# Patient Record
Sex: FEMALE | Race: WHITE | NOT HISPANIC OR LATINO
[De-identification: names, ages, dates, MRNs, and addresses within clinical notes are randomized per-mention and may not be internally consistent; named-entity substitution may affect disease eponyms.]

---

## 2019-08-26 ENCOUNTER — APPOINTMENT (OUTPATIENT)
Dept: ORTHOPEDIC SURGERY | Facility: CLINIC | Age: 74
End: 2019-08-26
Payer: MEDICARE

## 2019-08-26 DIAGNOSIS — M41.86 OTHER FORMS OF SCOLIOSIS, LUMBAR REGION: ICD-10-CM

## 2019-08-26 DIAGNOSIS — Z85.43 PERSONAL HISTORY OF MALIGNANT NEOPLASM OF OVARY: ICD-10-CM

## 2019-08-26 DIAGNOSIS — J18.9 PNEUMONIA, UNSPECIFIED ORGANISM: ICD-10-CM

## 2019-08-26 DIAGNOSIS — M54.5 LOW BACK PAIN: ICD-10-CM

## 2019-08-26 DIAGNOSIS — I48.2 CHRONIC ATRIAL FIBRILLATION: ICD-10-CM

## 2019-08-26 PROCEDURE — 99204 OFFICE O/P NEW MOD 45 MIN: CPT

## 2019-08-26 PROCEDURE — 72100 X-RAY EXAM L-S SPINE 2/3 VWS: CPT

## 2019-08-26 PROCEDURE — 72070 X-RAY EXAM THORAC SPINE 2VWS: CPT

## 2019-08-27 PROBLEM — M41.86 OTHER FORM OF SCOLIOSIS OF LUMBAR SPINE: Status: ACTIVE | Noted: 2019-08-27

## 2019-08-27 PROBLEM — J18.9 COMMUNITY ACQUIRED PNEUMONIA: Status: RESOLVED | Noted: 2019-08-27 | Resolved: 2019-08-27

## 2019-08-27 PROBLEM — M54.5 ACUTE RIGHT-SIDED LOW BACK PAIN, UNSPECIFIED WHETHER SCIATICA PRESENT: Status: ACTIVE | Noted: 2019-08-27

## 2019-08-27 PROBLEM — Z85.43 OVARIAN CANCER IN REMISSION: Status: RESOLVED | Noted: 2019-08-27 | Resolved: 2019-08-27

## 2019-08-27 PROBLEM — I48.2 CHRONIC ATRIAL FIBRILLATION: Status: ACTIVE | Noted: 2019-08-27

## 2019-08-27 NOTE — DISCUSSION/SUMMARY
[Medication Risks Reviewed] : Medication risks reviewed [de-identified] : Feeling at [facility] was that her back pain was not related to her prior ovarian cancer.  Currently she cannot take nonsteroidal anti-inflammatory medications as she is on anticoagulation therapy for her atrial fibrillation.  She has already had a double Medrol Dosepak.  Currently her cardiologist is recommending a cardioversion after which she could take nonsteroidal anti-inflammatory medications.  They will bring me copies of the prior imaging studies for my review.

## 2019-08-27 NOTE — REASON FOR VISIT
[Degenerative Joint Disease] : degenerative joint disease [Initial Visit] : an initial visit for [Back Pain] : back pain [Spouse] : spouse [Spondylolistheses] : spondylolistheses

## 2019-08-27 NOTE — PHYSICAL EXAM
[de-identified] : She is fully alert and oriented with a normal mood and affect.  She transfers and moves around the secondary to lower back pain.  There are no cutaneous abnormalities or palpable bony defects of the spine.  There is no evidence of shortness of breath or respiratory distress.  There is no paravertebral muscle spasm but sidebending is limited.  There is no sciatic or trochanteric tenderness.  Forward flexion of the spine is limited to 40 degrees by lower back pain.  Her lower extremity neurological examination revealed 1+ symmetrical reflexes with normal motor power and sensation.  Straight leg raising is negative to 90 degrees.  Vascular exam shows no evidence of varicosities and there is no lymphedema.  Her knees have a full range of motion with normal stability.  There are no cutaneous abnormalities of the upper or lower extremities.  Her upper extremities are normal to inspection and her elbows have a full range of motion with normal motor power and stability. [de-identified] : AP and lateral x-rays of the thoracic and lumbar spine show no evidence of any destructive change.  She does have a moderate scoliosis.  At the area of transition from the thoracic to the lumbar curve today may be a mild deformity of 1 of the vertebral bodies from her fracture.  There is a grade 1/2 degenerative spondylolisthesis at L4-5.

## 2019-09-23 ENCOUNTER — LABORATORY RESULT (OUTPATIENT)
Age: 74
End: 2019-09-23

## 2019-09-23 ENCOUNTER — APPOINTMENT (OUTPATIENT)
Dept: ORTHOPEDIC SURGERY | Facility: CLINIC | Age: 74
End: 2019-09-23
Payer: MEDICARE

## 2019-09-23 PROCEDURE — 99215 OFFICE O/P EST HI 40 MIN: CPT

## 2019-09-24 ENCOUNTER — LABORATORY RESULT (OUTPATIENT)
Age: 74
End: 2019-09-24

## 2019-09-24 NOTE — DISCUSSION/SUMMARY
[Surgical risks reviewed] : Surgical risks reviewed [de-identified] : There is no history of any illness around the time of the onset of her symptoms but seeing all the studies today this is consistent with osteomyelitis.  She has been given a prescription today to get a CBC with a sed rate and a C-reactive protein and 3 sets of blood cultures.  She will get 3 more sets of blood cultures yesterday.  I discussed that if the sed rate and C-reactive protein is elevated that would further confirm the question of the diagnosis.  I also discussed if the cultures are negative she will need to stop her anticoagulation for an interventional radiology aspiration of the disc space to try to obtain the organism causing the infection rather than beginning with empiric therapy.  I will get her in contact with an infectious disease specialist as well and I will have her braced.

## 2019-09-24 NOTE — HISTORY OF PRESENT ILLNESS
[de-identified] : Since she was last seen she had a successful cardioversion.  She is still on anticoagulation.  She comes in today with all of her prior imaging studies as well as a new CT scan.  She has seen a neurosurgeon who told her she had 2 compression fractures and they discussed kyphoplasty.  Her pain is approximately the same. [Pain Location] : pain [Stable] : stable

## 2019-09-24 NOTE — PHYSICAL EXAM
[de-identified] : On examination there is no evidence of any excessive kyphosis.  Her lower extremity neurological exam continues to do so symmetrical reflexes with normal motor power in all lower extremity groups. [de-identified] : She brought all of her prior imaging studies and I have reviewed them at length.\par \par There is a CT scan that was performed at [facility] on June 27 that reveals only degenerative changes without evidence of significant abnormalities at the T12-L1 level.\par \par There is an MRI from June 11 that reveals marked signal changes adjacent to the T12-L1 disc space with some mild enhancement of the disc space consistent with discitis/osteomyelitis at that level.\par \par There is a recent CAT scan that shows destruction of the adjacent endplates at that level.  The geographical serpiginous nature of the destruction is certainly typical of discitis/osteomyelitis and not of compression fractures.

## 2019-09-26 ENCOUNTER — INPATIENT (INPATIENT)
Facility: HOSPITAL | Age: 74
LOS: 6 days | Discharge: HOME | DRG: 540 | End: 2019-10-03
Attending: INTERNAL MEDICINE | Admitting: INTERNAL MEDICINE
Payer: MEDICARE

## 2019-09-26 ENCOUNTER — CHART COPY (OUTPATIENT)
Age: 74
End: 2019-09-26

## 2019-09-26 VITALS
RESPIRATION RATE: 17 BRPM | WEIGHT: 117.95 LBS | HEIGHT: 64 IN | OXYGEN SATURATION: 99 % | TEMPERATURE: 98 F | SYSTOLIC BLOOD PRESSURE: 123 MMHG | DIASTOLIC BLOOD PRESSURE: 66 MMHG | HEART RATE: 90 BPM

## 2019-09-26 DIAGNOSIS — R78.81 BACTEREMIA: ICD-10-CM

## 2019-09-26 LAB
ALBUMIN SERPL ELPH-MCNC: 4 G/DL — SIGNIFICANT CHANGE UP (ref 3.3–5)
ALP SERPL-CCNC: 97 U/L — SIGNIFICANT CHANGE UP (ref 40–120)
ALT FLD-CCNC: 21 U/L — SIGNIFICANT CHANGE UP (ref 10–45)
ANION GAP SERPL CALC-SCNC: 10 MMOL/L — SIGNIFICANT CHANGE UP (ref 5–17)
AST SERPL-CCNC: 25 U/L — SIGNIFICANT CHANGE UP (ref 10–40)
BASOPHILS # BLD AUTO: 0 K/UL — SIGNIFICANT CHANGE UP (ref 0–0.2)
BASOPHILS NFR BLD AUTO: 0.4 % — SIGNIFICANT CHANGE UP (ref 0–2)
BILIRUB SERPL-MCNC: 0.3 MG/DL — SIGNIFICANT CHANGE UP (ref 0.2–1.2)
BUN SERPL-MCNC: 13 MG/DL — SIGNIFICANT CHANGE UP (ref 7–23)
CALCIUM SERPL-MCNC: 9.3 MG/DL — SIGNIFICANT CHANGE UP (ref 8.4–10.5)
CHLORIDE SERPL-SCNC: 102 MMOL/L — SIGNIFICANT CHANGE UP (ref 96–108)
CO2 SERPL-SCNC: 28 MMOL/L — SIGNIFICANT CHANGE UP (ref 22–31)
CREAT SERPL-MCNC: 0.7 MG/DL — SIGNIFICANT CHANGE UP (ref 0.5–1.3)
EOSINOPHIL # BLD AUTO: 0 K/UL — SIGNIFICANT CHANGE UP (ref 0–0.5)
EOSINOPHIL NFR BLD AUTO: 0.2 % — SIGNIFICANT CHANGE UP (ref 0–6)
GLUCOSE SERPL-MCNC: 83 MG/DL — SIGNIFICANT CHANGE UP (ref 70–99)
HCT VFR BLD CALC: 43.2 % — SIGNIFICANT CHANGE UP (ref 34.5–45)
HGB BLD-MCNC: 13.5 G/DL — SIGNIFICANT CHANGE UP (ref 11.5–15.5)
LYMPHOCYTES # BLD AUTO: 2.6 K/UL — SIGNIFICANT CHANGE UP (ref 1–3.3)
LYMPHOCYTES # BLD AUTO: 32.4 % — SIGNIFICANT CHANGE UP (ref 13–44)
MCHC RBC-ENTMCNC: 30 PG — SIGNIFICANT CHANGE UP (ref 27–34)
MCHC RBC-ENTMCNC: 31.3 GM/DL — LOW (ref 32–36)
MCV RBC AUTO: 95.9 FL — SIGNIFICANT CHANGE UP (ref 80–100)
MONOCYTES # BLD AUTO: 0.6 K/UL — SIGNIFICANT CHANGE UP (ref 0–0.9)
MONOCYTES NFR BLD AUTO: 8.1 % — SIGNIFICANT CHANGE UP (ref 2–14)
NEUTROPHILS # BLD AUTO: 4.6 K/UL — SIGNIFICANT CHANGE UP (ref 1.8–7.4)
NEUTROPHILS NFR BLD AUTO: 58.9 % — SIGNIFICANT CHANGE UP (ref 43–77)
PLATELET # BLD AUTO: 249 K/UL — SIGNIFICANT CHANGE UP (ref 150–400)
POTASSIUM SERPL-MCNC: 3.8 MMOL/L — SIGNIFICANT CHANGE UP (ref 3.5–5.3)
POTASSIUM SERPL-SCNC: 3.8 MMOL/L — SIGNIFICANT CHANGE UP (ref 3.5–5.3)
PROT SERPL-MCNC: 8 G/DL — SIGNIFICANT CHANGE UP (ref 6–8.3)
RBC # BLD: 4.51 M/UL — SIGNIFICANT CHANGE UP (ref 3.8–5.2)
RBC # FLD: 12.6 % — SIGNIFICANT CHANGE UP (ref 10.3–14.5)
SODIUM SERPL-SCNC: 140 MMOL/L — SIGNIFICANT CHANGE UP (ref 135–145)
WBC # BLD: 7.9 K/UL — SIGNIFICANT CHANGE UP (ref 3.8–10.5)
WBC # FLD AUTO: 7.9 K/UL — SIGNIFICANT CHANGE UP (ref 3.8–10.5)

## 2019-09-26 PROCEDURE — 99285 EMERGENCY DEPT VISIT HI MDM: CPT | Mod: GC

## 2019-09-26 RX ORDER — MAGNESIUM CHLORIDE
1 CRYSTALS MISCELLANEOUS
Qty: 0 | Refills: 0 | DISCHARGE

## 2019-09-26 RX ORDER — SELENIOUS ACID 40 UG/ML
1 INJECTION, SOLUTION INTRAVENOUS
Qty: 0 | Refills: 0 | DISCHARGE

## 2019-09-26 RX ORDER — ALPRAZOLAM 0.25 MG
1 TABLET ORAL
Qty: 0 | Refills: 0 | DISCHARGE

## 2019-09-26 RX ORDER — ALPRAZOLAM 0.25 MG
0.25 TABLET ORAL THREE TIMES A DAY
Refills: 0 | Status: DISCONTINUED | OUTPATIENT
Start: 2019-09-26 | End: 2019-10-03

## 2019-09-26 RX ORDER — METOPROLOL TARTRATE 50 MG
25 TABLET ORAL DAILY
Refills: 0 | Status: DISCONTINUED | OUTPATIENT
Start: 2019-09-26 | End: 2019-10-03

## 2019-09-26 RX ORDER — APIXABAN 2.5 MG/1
1 TABLET, FILM COATED ORAL
Qty: 0 | Refills: 0 | DISCHARGE

## 2019-09-26 RX ORDER — FOLIC ACID 0.8 MG
1 TABLET ORAL
Qty: 0 | Refills: 0 | DISCHARGE

## 2019-09-26 RX ORDER — VANCOMYCIN HCL 1 G
1000 VIAL (EA) INTRAVENOUS ONCE
Refills: 0 | Status: COMPLETED | OUTPATIENT
Start: 2019-09-26 | End: 2019-09-26

## 2019-09-26 RX ORDER — UBIDECARENONE 100 MG
1 CAPSULE ORAL
Qty: 0 | Refills: 0 | DISCHARGE

## 2019-09-26 RX ORDER — FOLIC ACID 0.8 MG
1 TABLET ORAL DAILY
Refills: 0 | Status: DISCONTINUED | OUTPATIENT
Start: 2019-09-26 | End: 2019-10-03

## 2019-09-26 RX ORDER — METOPROLOL TARTRATE 50 MG
1 TABLET ORAL
Qty: 0 | Refills: 0 | DISCHARGE

## 2019-09-26 RX ORDER — GABAPENTIN 400 MG/1
1 CAPSULE ORAL
Qty: 0 | Refills: 0 | DISCHARGE

## 2019-09-26 RX ORDER — GABAPENTIN 400 MG/1
300 CAPSULE ORAL AT BEDTIME
Refills: 0 | Status: DISCONTINUED | OUTPATIENT
Start: 2019-09-26 | End: 2019-10-03

## 2019-09-26 RX ORDER — VANCOMYCIN HCL 1 G
1000 VIAL (EA) INTRAVENOUS EVERY 12 HOURS
Refills: 0 | Status: DISCONTINUED | OUTPATIENT
Start: 2019-09-26 | End: 2019-10-03

## 2019-09-26 RX ORDER — OXYCODONE AND ACETAMINOPHEN 5; 325 MG/1; MG/1
1 TABLET ORAL ONCE
Refills: 0 | Status: DISCONTINUED | OUTPATIENT
Start: 2019-09-26 | End: 2019-09-26

## 2019-09-26 RX ORDER — PYRIDOXINE HCL (VITAMIN B6) 100 MG
1 TABLET ORAL
Qty: 0 | Refills: 0 | DISCHARGE

## 2019-09-26 RX ORDER — GABAPENTIN 400 MG/1
0 CAPSULE ORAL
Qty: 0 | Refills: 0 | DISCHARGE

## 2019-09-26 RX ORDER — VANCOMYCIN HCL 1 G
1000 VIAL (EA) INTRAVENOUS EVERY 12 HOURS
Refills: 0 | Status: DISCONTINUED | OUTPATIENT
Start: 2019-09-26 | End: 2019-09-26

## 2019-09-26 RX ORDER — APIXABAN 2.5 MG/1
5 TABLET, FILM COATED ORAL
Refills: 0 | Status: DISCONTINUED | OUTPATIENT
Start: 2019-09-26 | End: 2019-09-30

## 2019-09-26 RX ORDER — THIAMINE MONONITRATE (VIT B1) 100 MG
1 TABLET ORAL
Qty: 0 | Refills: 0 | DISCHARGE

## 2019-09-26 RX ORDER — APIXABAN 2.5 MG/1
0 TABLET, FILM COATED ORAL
Qty: 0 | Refills: 0 | DISCHARGE

## 2019-09-26 RX ORDER — ACETAMINOPHEN 500 MG
650 TABLET ORAL EVERY 6 HOURS
Refills: 0 | Status: DISCONTINUED | OUTPATIENT
Start: 2019-09-26 | End: 2019-10-03

## 2019-09-26 RX ORDER — ASCORBIC ACID 60 MG
1 TABLET,CHEWABLE ORAL
Qty: 0 | Refills: 0 | DISCHARGE

## 2019-09-26 RX ORDER — ACETAMINOPHEN 500 MG
2 TABLET ORAL
Qty: 0 | Refills: 0 | DISCHARGE

## 2019-09-26 RX ORDER — PREGABALIN 225 MG/1
1 CAPSULE ORAL
Qty: 0 | Refills: 0 | DISCHARGE

## 2019-09-26 RX ADMIN — Medication 0.25 MILLIGRAM(S): at 22:40

## 2019-09-26 RX ADMIN — GABAPENTIN 300 MILLIGRAM(S): 400 CAPSULE ORAL at 22:40

## 2019-09-26 RX ADMIN — APIXABAN 5 MILLIGRAM(S): 2.5 TABLET, FILM COATED ORAL at 20:05

## 2019-09-26 RX ADMIN — Medication 250 MILLIGRAM(S): at 13:06

## 2019-09-26 RX ADMIN — OXYCODONE AND ACETAMINOPHEN 1 TABLET(S): 5; 325 TABLET ORAL at 20:05

## 2019-09-26 RX ADMIN — OXYCODONE AND ACETAMINOPHEN 1 TABLET(S): 5; 325 TABLET ORAL at 21:15

## 2019-09-26 NOTE — H&P ADULT - NSICDXPASTMEDICALHX_GEN_ALL_CORE_FT
PAST MEDICAL HISTORY:  Atrial fibrillation     Back pain spasms    Guillain-[place]     Hypertension     Ovarian cancer

## 2019-09-26 NOTE — CONSULT NOTE ADULT - SUBJECTIVE AND OBJECTIVE BOX
HPI:   Patient is a 73y female with    REVIEW OF SYSTEMS:  All other review of systems negative (Comprehensive ROS)    PAST MEDICAL & SURGICAL HISTORY:      Allergies    niacin (Anaphylaxis; Rash)    Intolerances        Antimicrobials Day #      Other Medications:      FAMILY HISTORY:      SOCIAL HISTORY:  Smoking:     ETOH:     Drug Use:     Single     T(F): 98 (09-26-19 @ 11:03), Max: 98 (09-26-19 @ 11:03)  HR: 90 (09-26-19 @ 11:03)  BP: 123/66 (09-26-19 @ 11:03)  RR: 17 (09-26-19 @ 11:03)  SpO2: 99% (09-26-19 @ 11:03)  Wt(kg): --    PHYSICAL EXAM:  General: alert, no acute distress  Eyes:  anicteric, no conjunctival injection, no discharge  Oropharynx: no lesions or injection 	  Neck: supple, without adenopathy  Lungs: clear to auscultation  Heart: regular rate and rhythm; no murmur, rubs or gallops  Abdomen: soft, nondistended, nontender, without mass or organomegaly  Skin: no lesions  Extremities: no clubbing, cyanosis, or edema  Neurologic: alert, oriented, moves all extremities    LAB RESULTS:                MICROBIOLOGY REVIEWED:    RADIOLOGY REVIEWED: HPI:   Patient is a 73y female with hx ovarian CA, prior chemo, s/p resection, connie- and splenectomy, has R chest port for many years, recent afib, s/p cardioversion. Pt had back pain since about June, was recently seen by ortho spine and concern was raised possible infection in a spine: discitis/osteo. Pt had blood cult done  9/23-4/4 bottles with GPC presumptive CoNS. blood cult repeated on 9/24 and so far no growth. Pt states she had no fevers, gets occasional chills. NO recent abx use, no recent procedures except cardioversion about 2 weeks ago, CHRISTI done at that time showed "leaky aortic valve". NO recent travel, no sick contact    REVIEW OF SYSTEMS:  All other review of systems negative (Comprehensive ROS)    PAST MEDICAL & SURGICAL HISTORY:      Allergies    niacin (Anaphylaxis; Rash)    Intolerances        Antimicrobials Day #      Other Medications:      FAMILY HISTORY:      SOCIAL HISTORY:  Smoking:     ETOH:     Drug Use:     Single     T(F): 98 (09-26-19 @ 11:03), Max: 98 (09-26-19 @ 11:03)  HR: 90 (09-26-19 @ 11:03)  BP: 123/66 (09-26-19 @ 11:03)  RR: 17 (09-26-19 @ 11:03)  SpO2: 99% (09-26-19 @ 11:03)  Wt(kg): --    PHYSICAL EXAM:  General: alert, no acute distress  Eyes:  anicteric, no conjunctival injection, no discharge  Oropharynx: no lesions or injection 	  Neck: supple, without adenopathy  Lungs: clear to auscultation  R chest port  Heart: regular rate and rhythm; no murmur, rubs or gallops  Abdomen: soft, nondistended, nontender, without mass or organomegaly  Skin: no lesions  Extremities: no clubbing, cyanosis, or edema  Neurologic: alert, oriented, moves all extremities    LAB RESULTS:                MICROBIOLOGY REVIEWED:    RADIOLOGY REVIEWED:

## 2019-09-26 NOTE — CONSULT NOTE ADULT - SUBJECTIVE AND OBJECTIVE BOX
Patient is a 73y Female who presents c/o low back pain since 6/23/19 and sent in today by ID doctor after positive blood cultures drawn as outpatient.  No recent falls/trauma. Denies HS/LOC. Denies pain/injury elsewhere. Denies numbness/tingling/paresthesias/weakness. Denies bowel/bladder incontinence. Denies fevers/chills. No other complaints at this time.    Had previously seen Dr. Reyna in office as well as neurosurgeon in [facility].     PAST MEDICAL & SURGICAL HISTORY:  Back pain: spasms  Guillain-[place]  Hypertension  Ovarian cancer  Atrial fibrillation    MEDICATIONS  (STANDING):      Allergies    niacin (Anaphylaxis; Rash)    Intolerances                         13.5   7.9   )-----------( 249      ( 26 Sep 2019 13:05 )             43.2       26 Sep 2019 13:05    140    |  102    |  13     ----------------------------<  83     3.8     |  28     |  0.70     Ca    9.3        26 Sep 2019 13:05    TPro  8.0    /  Alb  4.0    /  TBili  0.3    /  DBili  x      /  AST  25     /  ALT  21     /  AlkPhos  97     26 Sep 2019 13:05              Vital Signs Last 24 Hrs  T(C): 36.7 (09-26-19 @ 12:45), Max: 36.7 (09-26-19 @ 11:03)  T(F): 98 (09-26-19 @ 12:45), Max: 98 (09-26-19 @ 11:03)  HR: 67 (09-26-19 @ 12:45) (67 - 90)  BP: 105/63 (09-26-19 @ 12:45) (105/63 - 123/66)  BP(mean): --  RR: 18 (09-26-19 @ 12:45) (17 - 18)  SpO2: 98% (09-26-19 @ 12:45) (98% - 99%)    Gen: NAD    Spine PE:  Skin intact  No gross deformity  No midline TTP C/T/L/S spine  No bony step offs  No paraspinal muscle ttp/hypertonicity   Negative clonus  Negative babinski  Negative isaacs  No saddle anesthesia    Motor:                   C5                C6              C7               C8           T1   R            5/5                5/5            5/5             5/5          5/5  L             5/5               5/5             5/5             5/5          5/5                L2             L3             L4               L5            S1  R         5/5           5/5          5/5             5/5           5/5  L          5/5          5/5           5/5             5/5           5/5    Sensory:            C5         C6         C7      C8       T1        (0=absent, 1=impaired, 2=normal, NT=not testable)  R         2            2           2        2         2  L          2            2           2        2         2               L2          L3         L4      L5       S1         (0=absent, 1=impaired, 2=normal, NT=not testable)  R         2            2            2        2        2  L          2            2           2        2         2    Secondary Survey: No TTP over bony prominences, SILT, palpable pulses, full/painless range of motion, compartments soft    Imaging: Outpatien imaging consistent with T12 osteodiscitis, sequential imaging reviewed by Dr. Reyna and discussed with musculoskeletal radiologist    A/P: 73y Female with T12 osteodiscitis likely secondary to CNS  Medical admission for conservative treatment of osteodiscitis  No new imaging needed at this time  FU ESR/CRP  FU Blood cultures  Appreciate ID recs  Pain control  WBAT with assistive devices as needed - FU Brace fitting tomorrow  SCDs  No acute orthopedic surgical intervention  Discussed with Dr. Reyna, agrees with above

## 2019-09-26 NOTE — ED ADULT NURSE NOTE - CHPI ED NUR SYMPTOMS NEG
no chills/no decreased eating/drinking/no tingling/no fever/no nausea/no vomiting/no weakness/no dizziness

## 2019-09-26 NOTE — H&P ADULT - HISTORY OF PRESENT ILLNESS
73y female with hx ovarian CA, prior chemo, s/p resection, connie- and splenectomy, has R chest port for many years, recent afib, s/p cardioversion. Pt had back pain since about June, was recently seen by ortho spine and concern was raised possible infection in a spine: discitis/osteo. Pt had blood cult done  9/23-4/4 bottles with GPC presumptive CoNS. blood cult repeated on 9/24 and so far no growth. Pt states she had no fevers, gets occasional chills. NO recent abx use, no recent procedures except cardioversion about 2 weeks ago, CHRISTI done at that time showed "leaky aortic valve". NO recent travel, no sick contact  pt denies weakness/ numbness in ext , dysuria, urinary / bowel incontinence / abd pain , nausea,v  headache, dizzienss, chest pain, shortness of breath 73y female with hx ovarian CA, prior chemo, s/p resection, connie- and splenectomy, has R chest port for many years, recent afib, s/p cardioversion. Pt had back pain since about June, was recently seen by ortho spine and concern was raised possible infection in a spine: discitis/osteo. Pt had blood cult done  9/23-4/4 bottles with GPC presumptive CoNS. blood cult repeated on 9/24 and so far no growth. Pt states she had no fevers, gets occasional chills. NO recent abx use, no recent procedures except cardioversion about 2 weeks ago, CHRISTI done at that time showed "leaky aortic valve". NO recent travel, no sick contact  pt denies weakness/ numbness in ext , dysuria, urinary / bowel incontinence / abd pain , nausea,v  headache, dizzienss, chest pain, shortness of breath  pt says her back pain is much better last 4 days

## 2019-09-26 NOTE — ED PROVIDER NOTE - OBJECTIVE STATEMENT
72 yo F hx ovarian CA treated years ago w/ port in place last cleaned in August, AF recent cardioversion 2 weeks ago on metoprolol and eliquis, presenting due to +Bcx drawn outpt. Has had weeks of b/l lower back pain. Saw Dr Reyna orthopedic surgeon and had CT done that showed compressoin fx vs osteomyelitis per the pt (reports not available, CDs with Axel) had blodo cultures drawn due to ?osteo and found to have coag neg staph in all 4 bottles so MOrrison called Dr Brief of ID and pt told to go to ER. Denies recent f/c n/v/d cp/sob, and notes her back pain is slightly improved over the last few days.

## 2019-09-26 NOTE — ED PROVIDER NOTE - PROGRESS NOTE DETAILS
Spoke w/ ortho resident who discussed case with Dr Reyna. Dr Reyna has reviewed the imaging w/ a [facility] radiologist and they agree pt has T12 discitis. Dr Reyna will be bringing imaging in tomrrow (The CD of the CT scan is currently with Axel). In light of this will hold on repeat CT scan. This was d/w Dr Luna as well. Orthopedics Dr Reyna did not want MRI spine at this time, would like to empirically tx for osteomyelitis at this time. Admitting physician Dr. Luna understands plan

## 2019-09-26 NOTE — ED ADULT NURSE NOTE - NSIMPLEMENTINTERV_GEN_ALL_ED
Implemented All Fall with Harm Risk Interventions:  Rose to call system. Call bell, personal items and telephone within reach. Instruct patient to call for assistance. Room bathroom lighting operational. Non-slip footwear when patient is off stretcher. Physically safe environment: no spills, clutter or unnecessary equipment. Stretcher in lowest position, wheels locked, appropriate side rails in place. Provide visual cue, wrist band, yellow gown, etc. Monitor gait and stability. Monitor for mental status changes and reorient to person, place, and time. Review medications for side effects contributing to fall risk. Reinforce activity limits and safety measures with patient and family. Provide visual clues: red socks.

## 2019-09-26 NOTE — ED PROVIDER NOTE - CLINICAL SUMMARY MEDICAL DECISION MAKING FREE TEXT BOX
Pt p/w + BCx on outpt workup and possible outpt imaging of osteomyelitis, no focal findings on exam and VS wnl. Plan: ID c/s already initiated by outpatient providers, labs, repeat cultures, repeat imaging, likely admission. Pt p/w + BCx on outpt workup and possible outpt imaging of osteomyelitis, no focal findings on exam and VS wnl. Plan: ID c/s already initiated by outpatient providers, labs, repeat cultures, repeat imaging, likely admission.    GARIMA Joy MD: Pt is a 74 y/o female with PMH ovarian CA treated years ago (port still in place, cleaned in August), AF recent cardioversion 2 weeks ago on metoprolol and eliquis, presenting due to +Bcx drawn outpt. Has had weeks of b/l lower back pain. Saw Dr Reyna orthopedic surgeon and had CT done that showed compression fx vs osteomyelitis per the pt (reports not available, CDs with Axel) had blood cultures drawn due to ?osteo and found to have coag neg staph in all 4 bottles so MOrrison called Dr Brief of ID and pt told to go to ER. Denies recent f/c n/v/d cp/sob, and notes her back pain is slightly improved over the last few days. Denies focal numbness/weakness, bowel/bladder incontinence, saddle anesthesia, pain radiating down legs, IVDA, f/c. Ddx includes, however, is not limited to: osteomyelitis / discitis, port infection, uti, pna, other. Plan: basic labs, bcx, vbg, spine consultation, empiric IV abx, TBA

## 2019-09-26 NOTE — H&P ADULT - ASSESSMENT
pt with above history p/w back pain , + blood cx as out pt, as per ortho likely T12 Osteo as per outside imaging     Back pain - MRI with contrast , pain control,     Bacteremia-  repeat blood cx , ID consult evaluated pt - start Vancomycin 1g IV q12, adjust based on cr clearance and vanco level  , echo     Afib - pt in sinus at present , cont eliquis    -

## 2019-09-26 NOTE — CONSULT NOTE ADULT - ASSESSMENT
73y female with hx ovarian CA, prior chemo,  s/p resection, connie- and splenectomy,  She has R chest port for many years, recent afib, s/p cardioversion.   Pt had back pain since about June, was recently seen by ortho spine and concern was raised possible infection in a spine: discitis/osteo.   Pt had blood cult done as outpatient, 9/23-4/4 bottles with GPC presumptive CoNS.   blood cult repeated on 9/24 and so far no growth. Pt states she had no fevers, gets occasional chills.   NO recent abx use, no recent procedures except cardioversion about 2 weeks ago, CHRISTI done at that time showed "leaky aortic valve".     PLAN:  repeat blood cult now  start Vancomycin 1g IV q12  consider repeat echo, may need CHRISTI to r/o veg  suggest eval by vascular or IR for port removal  d/w pt's family at length  d/w ER physician 73y female with hx ovarian CA, prior chemo,  s/p resection, connie- and splenectomy,  She has R chest port for many years, recent afib, s/p cardioversion.   Pt had back pain since about June, was recently seen by ortho spine and concern was raised possible infection in a spine: discitis/osteo.   Pt had blood cult done as outpatient, 9/23-4/4 bottles with GPC presumptive CoNS.   blood cult repeated on 9/24 and so far no growth. Pt states she had no fevers, gets occasional chills.   NO recent abx use, no recent procedures except cardioversion about 2 weeks ago, CHRISTI done at that time showed "leaky aortic valve".     PLAN:  repeat blood cult now  start Vancomycin 1g IV q12, adjust based on cr clearance and vanco level    consider repeat echo, may need CHRISTI to r/o veg  suggest eval by vascular or IR for port removal  d/w pt's family at length  d/w ER physician

## 2019-09-26 NOTE — ED PROVIDER NOTE - PHYSICAL EXAMINATION
General: Alert and Oriented. No apparent distress.  Head: Normocephalic and atraumatic.  Eyes: PERRLA with EOMI.  Neck: Supple. Trachea midline.   Cardiac: Normal S1 and S2 w/ RRR. No murmurs appreciated.   Pulmonary: Vesicular breath sounds bilaterally. No increased WOB. No wheezes or crackles.  Abdominal: Soft, non-tender. Normoactive bowel sounds.  Neurologic: No focal sensory or motor deficits.  Musculoskeletal: Strength appropriate in all 4 extremities for age with no limited ROM. No bony or paraspinal tenderness of the back. Gait steady.  Skin: Color appropriate for race. Intact, warm, and well-perfused.

## 2019-09-26 NOTE — ED ADULT NURSE NOTE - OBJECTIVE STATEMENT
Pt is a 74 yo F who came to the ED amb c/o back pain and spasms. States she had ct scans done was told by her orthopedist that she has a "blood infection" that spread to her spine, but her neurosurgeon says she has a compression fracture. Pt is a 74 yo F who came to the ED amb c/o back pain and spasms x 13 weeks. States she had ct scans done was told by her orthopedist that she has a "blood infection" that spread to her spine, but her neurosurgeon says she has a compression fracture T12-L1. Ambulatory, Pt is a 74 yo F who came to the ED amb c/o back pain and spasms x 13 weeks. States she had ct scans done was told by her orthopedist that she has a "blood infection" that spread to her spine, but her neurosurgeon says she has a compression fracture T12-L1. Ambulatory, no numbness/tingling in extremities, no change in urine/bowel habits. A/O x3 pain 4/10 left side to mid back. Denies cp/sob/palpitations, no fever/chills.

## 2019-09-26 NOTE — ED ADULT TRIAGE NOTE - CHIEF COMPLAINT QUOTE
sent here from infectious disease MD Brief, was told she has a blood infection and it went to her spine.  has been having chronic back pain for the past 12 weeks and was called today to come to ED

## 2019-09-27 ENCOUNTER — TRANSCRIPTION ENCOUNTER (OUTPATIENT)
Age: 74
End: 2019-09-27

## 2019-09-27 LAB
ALBUMIN SERPL ELPH-MCNC: 3.4 G/DL — SIGNIFICANT CHANGE UP (ref 3.3–5)
ALP SERPL-CCNC: 79 U/L — SIGNIFICANT CHANGE UP (ref 40–120)
ALT FLD-CCNC: 19 U/L — SIGNIFICANT CHANGE UP (ref 10–45)
ANION GAP SERPL CALC-SCNC: 11 MMOL/L — SIGNIFICANT CHANGE UP (ref 5–17)
AST SERPL-CCNC: 23 U/L — SIGNIFICANT CHANGE UP (ref 10–40)
BASOPHILS # BLD AUTO: 0.04 K/UL — SIGNIFICANT CHANGE UP (ref 0–0.2)
BASOPHILS NFR BLD AUTO: 0.5 % — SIGNIFICANT CHANGE UP (ref 0–2)
BILIRUB SERPL-MCNC: 0.4 MG/DL — SIGNIFICANT CHANGE UP (ref 0.2–1.2)
BUN SERPL-MCNC: 10 MG/DL — SIGNIFICANT CHANGE UP (ref 7–23)
CALCIUM SERPL-MCNC: 9 MG/DL — SIGNIFICANT CHANGE UP (ref 8.4–10.5)
CHLORIDE SERPL-SCNC: 101 MMOL/L — SIGNIFICANT CHANGE UP (ref 96–108)
CO2 SERPL-SCNC: 26 MMOL/L — SIGNIFICANT CHANGE UP (ref 22–31)
CREAT SERPL-MCNC: 0.65 MG/DL — SIGNIFICANT CHANGE UP (ref 0.5–1.3)
CRP SERPL-MCNC: 0.23 MG/DL — SIGNIFICANT CHANGE UP (ref 0–0.4)
EOSINOPHIL # BLD AUTO: 0.09 K/UL — SIGNIFICANT CHANGE UP (ref 0–0.5)
EOSINOPHIL NFR BLD AUTO: 1.1 % — SIGNIFICANT CHANGE UP (ref 0–6)
ERYTHROCYTE [SEDIMENTATION RATE] IN BLOOD: 25 MM/HR — HIGH (ref 0–20)
GLUCOSE SERPL-MCNC: 89 MG/DL — SIGNIFICANT CHANGE UP (ref 70–99)
HCT VFR BLD CALC: 38.6 % — SIGNIFICANT CHANGE UP (ref 34.5–45)
HCV AB S/CO SERPL IA: 0.25 S/CO — SIGNIFICANT CHANGE UP (ref 0–0.99)
HCV AB SERPL-IMP: SIGNIFICANT CHANGE UP
HGB BLD-MCNC: 12.2 G/DL — SIGNIFICANT CHANGE UP (ref 11.5–15.5)
IMM GRANULOCYTES NFR BLD AUTO: 0.2 % — SIGNIFICANT CHANGE UP (ref 0–1.5)
LYMPHOCYTES # BLD AUTO: 3.39 K/UL — HIGH (ref 1–3.3)
LYMPHOCYTES # BLD AUTO: 41.6 % — SIGNIFICANT CHANGE UP (ref 13–44)
MCHC RBC-ENTMCNC: 29.7 PG — SIGNIFICANT CHANGE UP (ref 27–34)
MCHC RBC-ENTMCNC: 31.6 GM/DL — LOW (ref 32–36)
MCV RBC AUTO: 93.9 FL — SIGNIFICANT CHANGE UP (ref 80–100)
MONOCYTES # BLD AUTO: 0.7 K/UL — SIGNIFICANT CHANGE UP (ref 0–0.9)
MONOCYTES NFR BLD AUTO: 8.6 % — SIGNIFICANT CHANGE UP (ref 2–14)
NEUTROPHILS # BLD AUTO: 3.9 K/UL — SIGNIFICANT CHANGE UP (ref 1.8–7.4)
NEUTROPHILS NFR BLD AUTO: 48 % — SIGNIFICANT CHANGE UP (ref 43–77)
PLATELET # BLD AUTO: 238 K/UL — SIGNIFICANT CHANGE UP (ref 150–400)
POTASSIUM SERPL-MCNC: 3.7 MMOL/L — SIGNIFICANT CHANGE UP (ref 3.5–5.3)
POTASSIUM SERPL-SCNC: 3.7 MMOL/L — SIGNIFICANT CHANGE UP (ref 3.5–5.3)
PROT SERPL-MCNC: 6.9 G/DL — SIGNIFICANT CHANGE UP (ref 6–8.3)
RBC # BLD: 4.11 M/UL — SIGNIFICANT CHANGE UP (ref 3.8–5.2)
RBC # FLD: 13.9 % — SIGNIFICANT CHANGE UP (ref 10.3–14.5)
SODIUM SERPL-SCNC: 138 MMOL/L — SIGNIFICANT CHANGE UP (ref 135–145)
TSH SERPL-MCNC: 3.99 UIU/ML — SIGNIFICANT CHANGE UP (ref 0.27–4.2)
WBC # BLD: 8.14 K/UL — SIGNIFICANT CHANGE UP (ref 3.8–10.5)
WBC # FLD AUTO: 8.14 K/UL — SIGNIFICANT CHANGE UP (ref 3.8–10.5)

## 2019-09-27 PROCEDURE — 72149 MRI LUMBAR SPINE W/DYE: CPT | Mod: 26

## 2019-09-27 PROCEDURE — 99222 1ST HOSP IP/OBS MODERATE 55: CPT

## 2019-09-27 RX ORDER — OXYCODONE AND ACETAMINOPHEN 5; 325 MG/1; MG/1
1 TABLET ORAL EVERY 6 HOURS
Refills: 0 | Status: DISCONTINUED | OUTPATIENT
Start: 2019-09-27 | End: 2019-10-03

## 2019-09-27 RX ADMIN — Medication 250 MILLIGRAM(S): at 17:23

## 2019-09-27 RX ADMIN — APIXABAN 5 MILLIGRAM(S): 2.5 TABLET, FILM COATED ORAL at 18:00

## 2019-09-27 RX ADMIN — OXYCODONE AND ACETAMINOPHEN 1 TABLET(S): 5; 325 TABLET ORAL at 17:00

## 2019-09-27 RX ADMIN — APIXABAN 5 MILLIGRAM(S): 2.5 TABLET, FILM COATED ORAL at 09:02

## 2019-09-27 RX ADMIN — Medication 250 MILLIGRAM(S): at 00:42

## 2019-09-27 RX ADMIN — Medication 1 TABLET(S): at 11:14

## 2019-09-27 RX ADMIN — Medication 1 MILLIGRAM(S): at 11:14

## 2019-09-27 RX ADMIN — Medication 25 MILLIGRAM(S): at 07:02

## 2019-09-27 RX ADMIN — Medication 0.25 MILLIGRAM(S): at 22:39

## 2019-09-27 RX ADMIN — Medication 0.25 MILLIGRAM(S): at 11:59

## 2019-09-27 RX ADMIN — OXYCODONE AND ACETAMINOPHEN 1 TABLET(S): 5; 325 TABLET ORAL at 17:30

## 2019-09-27 RX ADMIN — GABAPENTIN 300 MILLIGRAM(S): 400 CAPSULE ORAL at 22:39

## 2019-09-27 NOTE — DISCHARGE NOTE PROVIDER - CARE PROVIDERS DIRECT ADDRESSES
,DirectAddress_Unknown ,DirectAddress_Unknown,[email or web address],DirectAddress_Unknown ,[email or web address],DirectAddress_Unknown,DirectAddress_Unknown,[email or web address]

## 2019-09-27 NOTE — CONSULT NOTE ADULT - ASSESSMENT
73y female with hx ovarian CA, prior chemo, s/p resection, connie- and splenectomy, has R chest port for many years, recent afib diagnosed in June of this year, s/p cardioversion with CHRISTI about two weeks ago presents with back pain.    PAF  - Currently maintaining NSR  - No cardiac symptoms  - Continue Toprol XL 25 QD.  HR and BP acceptable  - Continue Eliquis 5 bid for stroke risk reduction  - No evidence of abnormal bleeding  - Check 2D echo routinely    Positive cultures  - Follow up ID  - Abx per primary team  - Suspicion for endocarditis low given recent CHRISTI with no vegetation done at [facility]  - May be beneficial to request that study  - Regular 2D echocardiogram for now    No evidence of acute ischemia, volume overload, or uncontrolled arrythmia  Monitor and replete lytes as needed  To follow closely with you while admitted

## 2019-09-27 NOTE — CONSULT NOTE ADULT - SUBJECTIVE AND OBJECTIVE BOX
[facility] - Vanessa Caballero, Guru Armstrong, Irene, João Zhao  Office Number: [phone]    Initial Consult Note    CHIEF COMPLAINT: Patient is a 73y old  Female who presents with a chief complaint of back pain       HPI:  73y female with hx ovarian CA, prior chemo, s/p resection, connie- and splenectomy, has R chest port for many years, recent afib diagnosed in June of this year, s/p cardioversion with CHRISTI about two weeks ago presents with back pain. Pt had back pain since about June, was recently seen by ortho spine and concern was raised possible infection in a spine: discitis/osteo. Pt had blood cult done  9/23-4/4 bottles with [facility] presumptive CoNS. blood cult repeated on 9/24 and so far no growth. Pt states she had no fevers, gets occasional chills. NO recent abx use, no recent procedures except cardioversion about 2 weeks ago, CHRISTI done at that time showed "leaky aortic valve". NO recent travel, no sick contact  pt denies weakness/ numbness in ext , dysuria, urinary / bowel incontinence / abd pain , nausea,v  headache, dizzienss, chest pain, shortness of breath  pt says her back pain is much better last 4 days      This morning, she is resting comfortably in bed.  SHe has no complaints of chest pain, increased dsypnea, PND, orthopnea, LE swelling.     PAST MEDICAL & SURGICAL HISTORY:  Back pain: spasms  Guillain-[place]  Hypertension  Ovarian cancer  Atrial fibrillation      SOCIAL HISTORY:  No tobacco, ethanol, or drug abuse.    FAMILY HISTORY:    No family history of acute MI or sudden cardiac death.    MEDICATIONS  (STANDING):  apixaban 5 milliGRAM(s) Oral two times a day  folic acid 1 milliGRAM(s) Oral daily  gabapentin 300 milliGRAM(s) Oral at bedtime  metoprolol succinate ER 25 milliGRAM(s) Oral daily  multivitamin 1 Tablet(s) Oral daily  vancomycin  IVPB 1000 milliGRAM(s) IV Intermittent every 12 hours    MEDICATIONS  (PRN):  acetaminophen   Tablet .. 650 milliGRAM(s) Oral every 6 hours PRN Temp greater or equal to 38C (100.4F), Mild Pain (1 - 3)  ALPRAZolam 0.25 milliGRAM(s) Oral three times a day PRN anxiety      Allergies    niacin (Anaphylaxis; Rash)             REVIEW OF SYSTEMS:       All other review of systems is negative unless indicated above    VITAL SIGNS:   Vital Signs Last 24 Hrs  T(C): 36.2 (27 Sep 2019 05:08), Max: 37.1 (26 Sep 2019 17:30)  T(F): 97.2 (27 Sep 2019 05:08), Max: 98.7 (26 Sep 2019 17:30)  HR: 67 (27 Sep 2019 05:08) (62 - 90)  BP: 131/54 (27 Sep 2019 05:08) (101/61 - 131/54)  BP(mean): --  RR: 18 (27 Sep 2019 05:08) (17 - 19)  SpO2: 95% (27 Sep 2019 05:08) (92% - 99%)    I&O's Summary    26 Sep 2019 07:01  -  27 Sep 2019 06:35  --------------------------------------------------------  IN: 360 mL / OUT: 0 mL / NET: 360 mL        On Exam:    Constitutional: NAD, alert and oriented x 3  Lungs:  Non-labored, breath sounds are clear bilaterally, No wheezing, rales or rhonchi  Cardiovascular: RRR.  S1 and S2 positive.  No murmurs, rubs, gallops or clicks  Gastrointestinal: Bowel Sounds present, soft, nontender.   Lymph: No peripheral edema. No cervical lymphadenopathy.  Neurological: Alert, no focal deficits  Skin: No rashes or ulcers   Psych:  Mood & affect appropriate.    LABS: All Labs Reviewed:                        13.5   7.9   )-----------( 249      ( 26 Sep 2019 13:05 )             43.2     26 Sep 2019 13:05    140    |  102    |  13     ----------------------------<  83     3.8     |  28     |  0.70     Ca    9.3        26 Sep 2019 13:05    TPro  8.0    /  Alb  4.0    /  TBili  0.3    /  DBili  x      /  AST  25     /  ALT  21     /  AlkPhos  97     26 Sep 2019 13:05                   RADIOLOGY:    EKG:  NSR.  LAE. Poor R wave progression

## 2019-09-27 NOTE — PROGRESS NOTE ADULT - ASSESSMENT
pt with above history p/w back pain , + blood cx as out pt, as per ortho likely T12 Osteo as per outside imaging     Back pain - MRI with contrast , pain control, compression fracture +, ortho f/u     Bacteremia-  repeat blood cx , ID consult evaluated pt - started on Vancomycin 1g IV q12, adjust based on cr clearance and vanco level  , echo     Afib - pt in sinus at present , cont eliquis    -

## 2019-09-27 NOTE — DISCHARGE NOTE PROVIDER - NSDCCPCAREPLAN_GEN_ALL_CORE_FT
PRINCIPAL DISCHARGE DIAGNOSIS  Diagnosis: Positive blood culture  Assessment and Plan of Treatment:       SECONDARY DISCHARGE DIAGNOSES  Diagnosis: Atrial fibrillation  Assessment and Plan of Treatment: Continue Toprol XL 25 QD.  HR and BP acceptable  Continue Eliquis 5 bid for stroke risk reduction  Atrial fibrillation is the most common heart rhythm problem & has the risk of stroke & heart attack  It helps if you control your blood pressure, not drink more than 1-2 alcohol drinks per day, cut down on caffeine, getting treatment for over active thyroid gland, & getting exercise  Call your doctor if you feel your heart racing or beating unusually, chest tightness or pain, lightheaded, faint, shortness of breath especially with exercise  It is important to take your heart medication as prescribed  You may be on anticoagulation which is very important to take as directed - you may need blood work to monitor drug levels PRINCIPAL DISCHARGE DIAGNOSIS  Diagnosis: Positive blood culture  Assessment and Plan of Treatment: Vancomycin 750 mg IV q12h for 5 more weeks  follow up with ID in 1 week  weekly CBC w/diff, cmp, vanco level q week   return to the hospital if worsens, fever      SECONDARY DISCHARGE DIAGNOSES  Diagnosis: Atrial fibrillation  Assessment and Plan of Treatment: Continue Toprol XL 25 QD.  HR and BP acceptable  RESUME  Eliquis on 10/3/2019 two times per day for stroke risk reduction  Atrial fibrillation is the most common heart rhythm problem & has the risk of stroke & heart attack  It helps if you control your blood pressure, not drink more than 1-2 alcohol drinks per day, cut down on caffeine, getting treatment for over active thyroid gland, & getting exercise  Call your doctor if you feel your heart racing or beating unusually, chest tightness or pain, lightheaded, faint, shortness of breath especially with exercise  It is important to take your heart medication as prescribed  You may be on anticoagulation which is very important to take as directed - you may need blood work to monitor drug levels PRINCIPAL DISCHARGE DIAGNOSIS  Diagnosis: Positive blood culture  Assessment and Plan of Treatment: Vancomycin 750 mg IV q12h for 5 more weeks last dose 11/6/19 via PICC  follow up with ID in 1 week  weekly CBC w/diff, cmp, vanco level q week   return to the hospital if worsens, fever      SECONDARY DISCHARGE DIAGNOSES  Diagnosis: Atrial fibrillation  Assessment and Plan of Treatment: Continue Toprol XL 25 QD.  HR and BP acceptable  RESUME  Eliquis on 10/3/2019 two times per day for stroke risk reduction  Atrial fibrillation is the most common heart rhythm problem & has the risk of stroke & heart attack  It helps if you control your blood pressure, not drink more than 1-2 alcohol drinks per day, cut down on caffeine, getting treatment for over active thyroid gland, & getting exercise  Call your doctor if you feel your heart racing or beating unusually, chest tightness or pain, lightheaded, faint, shortness of breath especially with exercise  It is important to take your heart medication as prescribed  You may be on anticoagulation which is very important to take as directed - you may need blood work to monitor drug levels PRINCIPAL DISCHARGE DIAGNOSIS  Diagnosis: Positive blood culture  Assessment and Plan of Treatment: Vancomycin 750 mg IV q12h for 5 more weeks last dose 11/6/19 via PICC  follow up with ID in 1 week  weekly CBC w/diff, cmp, vanco level q week   return to the hospital if worsens, fever  Mediport dressing removal site - leaving dressing in place for 48 hours and then remove.  Steri strips can stay in place until they fall off on their own.      SECONDARY DISCHARGE DIAGNOSES  Diagnosis: Atrial fibrillation  Assessment and Plan of Treatment: Continue Toprol XL 25 QD.  HR and BP acceptable  RESUME  Eliquis on 10/3/2019 two times per day for stroke risk reduction  Atrial fibrillation is the most common heart rhythm problem & has the risk of stroke & heart attack  It helps if you control your blood pressure, not drink more than 1-2 alcohol drinks per day, cut down on caffeine, getting treatment for over active thyroid gland, & getting exercise  Call your doctor if you feel your heart racing or beating unusually, chest tightness or pain, lightheaded, faint, shortness of breath especially with exercise  It is important to take your heart medication as prescribed  You may be on anticoagulation which is very important to take as directed - you may need blood work to monitor drug levels PRINCIPAL DISCHARGE DIAGNOSIS  Diagnosis: Positive blood culture  Assessment and Plan of Treatment: Vancomycin 750 mg IV q12h for 5 more weeks last dose 11/6/19 via PICC  follow up with ID in 1 week  weekly CBC w/diff, cmp, vanco level q week   return to the hospital if worsens, fever  Mediport dressing removal site - leaving dressing in place for 48 hours and then remove.  Steri strips can stay in place until they fall off on their own.      SECONDARY DISCHARGE DIAGNOSES  Diagnosis: Osteomyelitis  Assessment and Plan of Treatment: Continue with your antibiotics until course is completed.  You can follow up with your orthopedic doctor, Dr. Reyna, after discharge.    Diagnosis: Atrial fibrillation  Assessment and Plan of Treatment: Continue Toprol XL 25 QD.  HR and BP acceptable  RESUME  Eliquis on 10/3/2019 two times per day for stroke risk reduction  Atrial fibrillation is the most common heart rhythm problem & has the risk of stroke & heart attack  It helps if you control your blood pressure, not drink more than 1-2 alcohol drinks per day, cut down on caffeine, getting treatment for over active thyroid gland, & getting exercise  Call your doctor if you feel your heart racing or beating unusually, chest tightness or pain, lightheaded, faint, shortness of breath especially with exercise  It is important to take your heart medication as prescribed  You may be on anticoagulation which is very important to take as directed - you may need blood work to monitor drug levels

## 2019-09-27 NOTE — DISCHARGE NOTE PROVIDER - CARE PROVIDER_API CALL
Cliff Hdez  Phone: (   )    -  Fax: (   )    -  Follow Up Time: Cliff Hdez  Phone: (   )    -  Fax: (   )    -  Follow Up Time:     Vashti Zhao)  Internal Medicine  [address] [place] [address]  Phone: [phone]  Fax: [phone]  Follow Up Time: 1 week    Davey Giron)  Internal Medicine  [address]  [place] [address]  Phone: [phone]  Fax: [phone]  Follow Up Time: 1 week Vashti Zhao)  Internal Medicine  [address] [place] [address]  Phone: [phone]  Fax: [phone]  Follow Up Time: 1 week    Davey Giron)  Internal Medicine  [address], [address]  [place] [address]  Phone: [phone]  Fax: [phone]  Follow Up Time: 1 week    Cliff Hdez  Phone: (   )    -  Fax: (   )    -  Follow Up Time:     Luis A Reyna)  Orthopaedic Surgery  [address], [address]  [place] [address]  Phone: [phone]  Fax: [phone]  Follow Up Time:

## 2019-09-27 NOTE — DISCHARGE NOTE PROVIDER - PROVIDER TOKENS
FREE:[LAST:[Ketty],FIRST:[Cliff],PHONE:[(   )    -],FAX:[(   )    -]] FREE:[LAST:[Ketty],FIRST:[Cliff],PHONE:[(   )    -],FAX:[(   )    -]],PROVIDER:[TOKEN:[[id number]],FOLLOWUP:[1 week]],PROVIDER:[TOKEN:[[id number]],FOLLOWUP:[1 week]] PROVIDER:[TOKEN:[[id number]],FOLLOWUP:[1 week]],PROVIDER:[TOKEN:[[id number]],FOLLOWUP:[1 week]],FREE:[LAST:[Ketty],FIRST:[Cliff],PHONE:[(   )    -],FAX:[(   )    -]],PROVIDER:[TOKEN:[[id number]]]

## 2019-09-27 NOTE — DISCHARGE NOTE PROVIDER - HOSPITAL COURSE
73y female with hx ovarian CA, prior chemo, s/p resection, connie- and splenectomy, has R chest port for many years, recent afib, s/p cardioversion. Pt had back pain since about June, was recently seen by ortho spine and concern was raised possible infection in a spine: discitis/osteo. Pt had blood cult done  9/23-4/4 bottles with [facility] presumptive CoNS. blood cult repeated on 9/24 and so far no growth. Pt states she had no fevers, gets occasional chills. NO recent abx use, no recent procedures except cardioversion about 2 weeks ago, CHRISTI done at that time showed "leaky aortic valve". NO recent travel, no sick contact    pt denies weakness/ numbness in ext , dysuria, urinary / bowel incontinence / abd pain , nausea,v  headache, dizzienss, chest pain, shortness of breath    pt says her back pain is much better last 4 days 73y female with hx ovarian CA, prior chemo 7 years ago, s/p resection, connie- and splenectomy, has R chest port for many years, recent afib diagnosed in June of this year, s/p cardioversion with CHRISTI 3 weeks ago presented with back pain.        -Back pain Discitis- MRI with contrast WITH T12/ L1 discitis/ osteo, pain control, compression fracture +        - Bacteremia    - ID Dr chakraborty consulted- Vancomycin IV x 6 weeks total, weekly CBC w/diff, CMP, vanco level    -Mediport removal and PICC insertion 10/1/2019    - Suspicion for endocarditis low given recent CHRISTI with no vegetation done at [facility]        -PAF now in NSR s/p CV 3 weeks ago, cardio consulted:     No evidence of acute ischemia, volume overload, or uncontrolled arrythmia    Pt to follow up with cardiology outpatient.        cleared for discharge home by Dr Luna with ID and cardiology outaptient follow up

## 2019-09-28 LAB — VANCOMYCIN TROUGH SERPL-MCNC: 11.5 UG/ML — SIGNIFICANT CHANGE UP (ref 10–20)

## 2019-09-28 PROCEDURE — 99232 SBSQ HOSP IP/OBS MODERATE 35: CPT

## 2019-09-28 RX ORDER — ONDANSETRON 8 MG/1
4 TABLET, FILM COATED ORAL ONCE
Refills: 0 | Status: DISCONTINUED | OUTPATIENT
Start: 2019-09-28 | End: 2019-10-03

## 2019-09-28 RX ADMIN — Medication 250 MILLIGRAM(S): at 06:32

## 2019-09-28 RX ADMIN — Medication 1 MILLIGRAM(S): at 10:06

## 2019-09-28 RX ADMIN — Medication 25 MILLIGRAM(S): at 10:06

## 2019-09-28 RX ADMIN — Medication 0.25 MILLIGRAM(S): at 15:00

## 2019-09-28 RX ADMIN — Medication 1 TABLET(S): at 10:06

## 2019-09-28 RX ADMIN — GABAPENTIN 300 MILLIGRAM(S): 400 CAPSULE ORAL at 21:47

## 2019-09-28 RX ADMIN — OXYCODONE AND ACETAMINOPHEN 1 TABLET(S): 5; 325 TABLET ORAL at 17:31

## 2019-09-28 RX ADMIN — Medication 250 MILLIGRAM(S): at 17:27

## 2019-09-28 RX ADMIN — Medication 0.25 MILLIGRAM(S): at 21:50

## 2019-09-28 RX ADMIN — APIXABAN 5 MILLIGRAM(S): 2.5 TABLET, FILM COATED ORAL at 17:30

## 2019-09-28 RX ADMIN — APIXABAN 5 MILLIGRAM(S): 2.5 TABLET, FILM COATED ORAL at 10:06

## 2019-09-28 RX ADMIN — OXYCODONE AND ACETAMINOPHEN 1 TABLET(S): 5; 325 TABLET ORAL at 16:52

## 2019-09-28 NOTE — PROGRESS NOTE ADULT - ASSESSMENT
pt with above history p/w back pain , + blood cx as out pt, as per ortho likely T12 Osteo as per outside imaging     Back pain - MRI with contrast WITH T12/ L1 discitis/ osteo, pain control, compression fracture +, ortho f/u     Bacteremia-  repeat blood cx , ID consult evaluated pt - started on Vancomycin 1g IV q12, adjust based on cr clearance and vanco level  , echo     Afib - pt in sinus at present , cont eliquis    -

## 2019-09-28 NOTE — PROGRESS NOTE ADULT - ASSESSMENT
72 yo F hx splenectomy , ovarian ca s/p chemo with port, developed back pain 3 months ago , had a course of po steroids and PT no better, had afib s/p CHRISTI 2 weeks ago and cardioversion with by report aortic insufficiency. She saw ortho spine who looked at recent spine imaging and saw om, discitis, had blood cultures drawn on 2 successive days now both days cultures are growing coag neg staph.   New mri shows om, discitis and phlegmon. Source of bacteremia is likely the port.     Plan:  continue vancomycin, monitor level  follow up latest cultures  remove port since she doesnt need more chemo and high grade bacteremia  cardiology input appreciated, 2d echo for now, since CHRISTI with cardioversion done 2 weeks ago with no veg  Ortho spine follows  picc once bacteremia is controlled

## 2019-09-28 NOTE — PROGRESS NOTE ADULT - ASSESSMENT
73y female with hx ovarian CA, prior chemo, s/p resection, connie- and splenectomy, has R chest port for many years, recent afib diagnosed in June of this year, s/p cardioversion with CHRISTI about two weeks ago presents with back pain.    PAF  - Currently maintaining NSR  - No cardiac symptoms  - Continue Toprol XL 25 QD.  HR and BP acceptable  - Continue Eliquis 5 bid for stroke risk reduction  - No evidence of abnormal bleeding  - Check 2D echo routinely    Positive cultures  - Follow up ID  - Abx per primary team  - Suspicion for endocarditis low given recent CHRISTI with no vegetation done at [facility]  - May be beneficial to request that study  - Regular 2D echocardiogram for now    No evidence of acute ischemia, volume overload, or uncontrolled arrythmia  Monitor and replete lytes as needed  To follow closely with you while admitted

## 2019-09-29 LAB
ANION GAP SERPL CALC-SCNC: 9 MMOL/L — SIGNIFICANT CHANGE UP (ref 5–17)
BUN SERPL-MCNC: 12 MG/DL — SIGNIFICANT CHANGE UP (ref 7–23)
CALCIUM SERPL-MCNC: 9.1 MG/DL — SIGNIFICANT CHANGE UP (ref 8.4–10.5)
CHLORIDE SERPL-SCNC: 102 MMOL/L — SIGNIFICANT CHANGE UP (ref 96–108)
CO2 SERPL-SCNC: 27 MMOL/L — SIGNIFICANT CHANGE UP (ref 22–31)
CREAT SERPL-MCNC: 0.71 MG/DL — SIGNIFICANT CHANGE UP (ref 0.5–1.3)
CULTURE RESULTS: SIGNIFICANT CHANGE UP
GLUCOSE SERPL-MCNC: 97 MG/DL — SIGNIFICANT CHANGE UP (ref 70–99)
GRAM STN FLD: SIGNIFICANT CHANGE UP
HCT VFR BLD CALC: 37.7 % — SIGNIFICANT CHANGE UP (ref 34.5–45)
HGB BLD-MCNC: 12 G/DL — SIGNIFICANT CHANGE UP (ref 11.5–15.5)
MCHC RBC-ENTMCNC: 30.2 PG — SIGNIFICANT CHANGE UP (ref 27–34)
MCHC RBC-ENTMCNC: 31.8 GM/DL — LOW (ref 32–36)
MCV RBC AUTO: 94.7 FL — SIGNIFICANT CHANGE UP (ref 80–100)
PLATELET # BLD AUTO: 222 K/UL — SIGNIFICANT CHANGE UP (ref 150–400)
POTASSIUM SERPL-MCNC: 3.9 MMOL/L — SIGNIFICANT CHANGE UP (ref 3.5–5.3)
POTASSIUM SERPL-SCNC: 3.9 MMOL/L — SIGNIFICANT CHANGE UP (ref 3.5–5.3)
RBC # BLD: 3.98 M/UL — SIGNIFICANT CHANGE UP (ref 3.8–5.2)
RBC # FLD: 13.8 % — SIGNIFICANT CHANGE UP (ref 10.3–14.5)
SODIUM SERPL-SCNC: 138 MMOL/L — SIGNIFICANT CHANGE UP (ref 135–145)
SPECIMEN SOURCE: SIGNIFICANT CHANGE UP
WBC # BLD: 6.57 K/UL — SIGNIFICANT CHANGE UP (ref 3.8–10.5)
WBC # FLD AUTO: 6.57 K/UL — SIGNIFICANT CHANGE UP (ref 3.8–10.5)

## 2019-09-29 PROCEDURE — 99232 SBSQ HOSP IP/OBS MODERATE 35: CPT

## 2019-09-29 RX ORDER — SENNA PLUS 8.6 MG/1
2 TABLET ORAL AT BEDTIME
Refills: 0 | Status: DISCONTINUED | OUTPATIENT
Start: 2019-09-29 | End: 2019-10-03

## 2019-09-29 RX ORDER — DOCUSATE SODIUM 100 MG
100 CAPSULE ORAL THREE TIMES A DAY
Refills: 0 | Status: DISCONTINUED | OUTPATIENT
Start: 2019-09-29 | End: 2019-10-03

## 2019-09-29 RX ORDER — POLYETHYLENE GLYCOL 3350 17 G/17G
17 POWDER, FOR SOLUTION ORAL DAILY
Refills: 0 | Status: DISCONTINUED | OUTPATIENT
Start: 2019-09-29 | End: 2019-10-03

## 2019-09-29 RX ADMIN — APIXABAN 5 MILLIGRAM(S): 2.5 TABLET, FILM COATED ORAL at 14:14

## 2019-09-29 RX ADMIN — OXYCODONE AND ACETAMINOPHEN 1 TABLET(S): 5; 325 TABLET ORAL at 19:02

## 2019-09-29 RX ADMIN — Medication 250 MILLIGRAM(S): at 17:28

## 2019-09-29 RX ADMIN — OXYCODONE AND ACETAMINOPHEN 1 TABLET(S): 5; 325 TABLET ORAL at 19:51

## 2019-09-29 RX ADMIN — Medication 1 MILLIGRAM(S): at 11:22

## 2019-09-29 RX ADMIN — APIXABAN 5 MILLIGRAM(S): 2.5 TABLET, FILM COATED ORAL at 21:14

## 2019-09-29 RX ADMIN — GABAPENTIN 300 MILLIGRAM(S): 400 CAPSULE ORAL at 21:14

## 2019-09-29 RX ADMIN — Medication 25 MILLIGRAM(S): at 05:02

## 2019-09-29 RX ADMIN — Medication 0.25 MILLIGRAM(S): at 21:13

## 2019-09-29 RX ADMIN — Medication 250 MILLIGRAM(S): at 05:02

## 2019-09-29 RX ADMIN — SENNA PLUS 2 TABLET(S): 8.6 TABLET ORAL at 21:14

## 2019-09-29 RX ADMIN — Medication 100 MILLIGRAM(S): at 21:14

## 2019-09-29 RX ADMIN — Medication 1 TABLET(S): at 11:22

## 2019-09-29 NOTE — PROGRESS NOTE ADULT - ASSESSMENT
73y female with hx ovarian CA, prior chemo, s/p resection, connie- and splenectomy, has R chest port for many years, recent afib diagnosed in June of this year, s/p cardioversion with CHRISTI about two weeks ago presents with back pain.    PAF  - Currently maintaining NSR  - No cardiac symptoms  - Continue Toprol XL 25 QD.  HR and BP acceptable  - Continue Eliquis 5 bid for stroke risk reduction, though will need to hold if plan for line extraction  - No evidence of abnormal bleeding  - Check 2D echo    Positive cultures  - Follow up ID  - Abx per primary team  - Suspicion for endocarditis low given recent CHRISTI with no vegetation done at [facility]  - May be beneficial to request that study  - Regular 2D echocardiogram for now    No evidence of acute ischemia, volume overload, or uncontrolled arrythmia  Monitor and replete lytes as needed  To follow closely with you while admitted

## 2019-09-29 NOTE — PROGRESS NOTE ADULT - ASSESSMENT
74 yo F hx splenectomy , ovarian ca s/p chemo with port, developed back pain 3 months ago , had a course of po steroids and PT no better, had afib s/p CHRISTI 2 weeks ago and cardioversion with by report aortic insufficiency.   She saw ortho spine who looked at recent spine imaging and saw om, discitis, had blood cultures drawn on 2 successive days now both days cultures are growing coag neg staph.   New mri shows om, discitis and phlegmon. Source of bacteremia is likely the port.   repeat cult 9/26 and 9/27 again with [facility] clusters    Plan:  continue vancomycin, monitor level  repeat cultures to ensure clearance of bacteremia  remove port since she doesnt need more chemo and high grade bacteremia  cardiology input appreciated, 2d echo for now, since CHRISTI with cardioversion done 2 weeks ago with no veg  Ortho spine follows  picc once bacteremia is controlled

## 2019-09-30 LAB
CULTURE RESULTS: SIGNIFICANT CHANGE UP
CULTURE RESULTS: SIGNIFICANT CHANGE UP
INR BLD: 1.31 RATIO — HIGH (ref 0.88–1.16)
PROTHROM AB SERPL-ACNC: 15 SEC — HIGH (ref 10–13.1)
SPECIMEN SOURCE: SIGNIFICANT CHANGE UP
SPECIMEN SOURCE: SIGNIFICANT CHANGE UP
VANCOMYCIN TROUGH SERPL-MCNC: 17.8 UG/ML — SIGNIFICANT CHANGE UP (ref 10–20)

## 2019-09-30 PROCEDURE — 93306 TTE W/DOPPLER COMPLETE: CPT | Mod: 26

## 2019-09-30 PROCEDURE — 99232 SBSQ HOSP IP/OBS MODERATE 35: CPT

## 2019-09-30 RX ADMIN — Medication 100 MILLIGRAM(S): at 05:48

## 2019-09-30 RX ADMIN — Medication 250 MILLIGRAM(S): at 06:01

## 2019-09-30 RX ADMIN — POLYETHYLENE GLYCOL 3350 17 GRAM(S): 17 POWDER, FOR SOLUTION ORAL at 11:39

## 2019-09-30 RX ADMIN — Medication 1 TABLET(S): at 11:38

## 2019-09-30 RX ADMIN — Medication 0.25 MILLIGRAM(S): at 21:54

## 2019-09-30 RX ADMIN — Medication 250 MILLIGRAM(S): at 17:44

## 2019-09-30 RX ADMIN — GABAPENTIN 300 MILLIGRAM(S): 400 CAPSULE ORAL at 21:55

## 2019-09-30 RX ADMIN — Medication 1 MILLIGRAM(S): at 11:38

## 2019-09-30 RX ADMIN — Medication 25 MILLIGRAM(S): at 05:48

## 2019-09-30 NOTE — PROGRESS NOTE ADULT - ASSESSMENT
74 yo F hx splenectomy , ovarian ca s/p chemo with port, developed back pain 3 months ago , had a course of po steroids and PT no better, had afib s/p CHRISTI 2 weeks ago and cardioversion with by report aortic insufficiency.   She saw ortho spine who looked at recent spine imaging and saw om, discitis, had blood cultures drawn on 2 successive days now both days cultures are growing coag neg staph.   New mri shows om, discitis and phlegmon. Source of bacteremia is likely the port.   repeat cult 9/26 and 9/27 again with [facility] clusters    Plan:  continue vancomycin, monitor level  f/u repeat cultures to ensure clearance of bacteremia  remove port since she doesnt need more chemo and high grade bacteremia  cardiology input appreciated, 2d echo for now, since CHRISTI with cardioversion done 2 weeks ago with no veg  Ortho spine follows  picc once bacteremia is controlled 72 yo F hx splenectomy , ovarian ca s/p chemo with port, developed back pain 3 months ago , had a course of po steroids and PT no better, had afib s/p CHRISTI 2 weeks ago and cardioversion with by report aortic insufficiency.   She saw ortho spine who looked at recent spine imaging and saw om, discitis, had blood cultures drawn on 2 successive days now both days cultures are growing coag neg staph.   New mri shows om, discitis and phlegmon. Source of bacteremia is likely the port.   repeat cult 9/26 and 9/27 again with [facility]PC clusters  2d Echo: mod AR    Plan:  continue vancomycin, monitor level  f/u repeat cultures to ensure clearance of bacteremia  remove port since she doesnt need more chemo and high grade bacteremia  cardiology input appreciated,  CHRISTI with cardioversion done 2 weeks ago with no veg  Ortho spine follows  picc once bacteremia is controlled

## 2019-09-30 NOTE — PROGRESS NOTE ADULT - ASSESSMENT
73y female with hx ovarian CA, prior chemo, s/p resection, connie- and splenectomy, has R chest port for many years, recent afib diagnosed in June of this year, s/p cardioversion with CHRISTI about two weeks ago presents with back pain.    PAF  - Currently maintaining NSR  - No cardiac symptoms  - Continue Toprol XL 25 QD.  HR and BP acceptable  - Continue Eliquis 5 bid for stroke risk reduction, though will need to hold if plan for line extraction  - No evidence of abnormal bleeding  - Check 2D echo    Positive cultures  - Follow up ID  - Abx per primary team  - Suspicion for endocarditis low given recent CHRISTI with no vegetation done at [facility]  - May be beneficial to request that study  - Regular 2D echocardiogram for now    No evidence of acute ischemia, volume overload, or uncontrolled arrythmia  Monitor and replete lytes as needed  To follow closely with you while admitted

## 2019-10-01 LAB
ALBUMIN SERPL ELPH-MCNC: 3.3 G/DL — SIGNIFICANT CHANGE UP (ref 3.3–5)
ALP SERPL-CCNC: 86 U/L — SIGNIFICANT CHANGE UP (ref 40–120)
ALT FLD-CCNC: 21 U/L — SIGNIFICANT CHANGE UP (ref 10–45)
ANION GAP SERPL CALC-SCNC: 9 MMOL/L — SIGNIFICANT CHANGE UP (ref 5–17)
AST SERPL-CCNC: 28 U/L — SIGNIFICANT CHANGE UP (ref 10–40)
BASOPHILS # BLD AUTO: 0.07 K/UL — SIGNIFICANT CHANGE UP (ref 0–0.2)
BASOPHILS NFR BLD AUTO: 1 % — SIGNIFICANT CHANGE UP (ref 0–2)
BILIRUB SERPL-MCNC: 0.4 MG/DL — SIGNIFICANT CHANGE UP (ref 0.2–1.2)
BUN SERPL-MCNC: 10 MG/DL — SIGNIFICANT CHANGE UP (ref 7–23)
CALCIUM SERPL-MCNC: 9 MG/DL — SIGNIFICANT CHANGE UP (ref 8.4–10.5)
CHLORIDE SERPL-SCNC: 102 MMOL/L — SIGNIFICANT CHANGE UP (ref 96–108)
CO2 SERPL-SCNC: 27 MMOL/L — SIGNIFICANT CHANGE UP (ref 22–31)
CREAT SERPL-MCNC: 0.69 MG/DL — SIGNIFICANT CHANGE UP (ref 0.5–1.3)
EOSINOPHIL # BLD AUTO: 0.1 K/UL — SIGNIFICANT CHANGE UP (ref 0–0.5)
EOSINOPHIL NFR BLD AUTO: 1.5 % — SIGNIFICANT CHANGE UP (ref 0–6)
GLUCOSE SERPL-MCNC: 104 MG/DL — HIGH (ref 70–99)
HCT VFR BLD CALC: 38.3 % — SIGNIFICANT CHANGE UP (ref 34.5–45)
HGB BLD-MCNC: 12.1 G/DL — SIGNIFICANT CHANGE UP (ref 11.5–15.5)
IMM GRANULOCYTES NFR BLD AUTO: 0.1 % — SIGNIFICANT CHANGE UP (ref 0–1.5)
LYMPHOCYTES # BLD AUTO: 2.4 K/UL — SIGNIFICANT CHANGE UP (ref 1–3.3)
LYMPHOCYTES # BLD AUTO: 35.6 % — SIGNIFICANT CHANGE UP (ref 13–44)
MAGNESIUM SERPL-MCNC: 1.9 MG/DL — SIGNIFICANT CHANGE UP (ref 1.6–2.6)
MCHC RBC-ENTMCNC: 30.3 PG — SIGNIFICANT CHANGE UP (ref 27–34)
MCHC RBC-ENTMCNC: 31.6 GM/DL — LOW (ref 32–36)
MCV RBC AUTO: 95.8 FL — SIGNIFICANT CHANGE UP (ref 80–100)
MONOCYTES # BLD AUTO: 0.73 K/UL — SIGNIFICANT CHANGE UP (ref 0–0.9)
MONOCYTES NFR BLD AUTO: 10.8 % — SIGNIFICANT CHANGE UP (ref 2–14)
NEUTROPHILS # BLD AUTO: 3.43 K/UL — SIGNIFICANT CHANGE UP (ref 1.8–7.4)
NEUTROPHILS NFR BLD AUTO: 51 % — SIGNIFICANT CHANGE UP (ref 43–77)
PLATELET # BLD AUTO: 231 K/UL — SIGNIFICANT CHANGE UP (ref 150–400)
POTASSIUM SERPL-MCNC: 4.1 MMOL/L — SIGNIFICANT CHANGE UP (ref 3.5–5.3)
POTASSIUM SERPL-SCNC: 4.1 MMOL/L — SIGNIFICANT CHANGE UP (ref 3.5–5.3)
PROT SERPL-MCNC: 6.5 G/DL — SIGNIFICANT CHANGE UP (ref 6–8.3)
RBC # BLD: 4 M/UL — SIGNIFICANT CHANGE UP (ref 3.8–5.2)
RBC # FLD: 13.6 % — SIGNIFICANT CHANGE UP (ref 10.3–14.5)
SODIUM SERPL-SCNC: 138 MMOL/L — SIGNIFICANT CHANGE UP (ref 135–145)
WBC # BLD: 6.74 K/UL — SIGNIFICANT CHANGE UP (ref 3.8–10.5)
WBC # FLD AUTO: 6.74 K/UL — SIGNIFICANT CHANGE UP (ref 3.8–10.5)

## 2019-10-01 PROCEDURE — 77001 FLUOROGUIDE FOR VEIN DEVICE: CPT | Mod: 26,59

## 2019-10-01 PROCEDURE — 36573 INSJ PICC RS&I 5 YR+: CPT

## 2019-10-01 PROCEDURE — 36590 REMOVAL TUNNELED CV CATH: CPT

## 2019-10-01 PROCEDURE — 99232 SBSQ HOSP IP/OBS MODERATE 35: CPT

## 2019-10-01 RX ORDER — CHLORHEXIDINE GLUCONATE 213 G/1000ML
1 SOLUTION TOPICAL
Refills: 0 | Status: DISCONTINUED | OUTPATIENT
Start: 2019-10-01 | End: 2019-10-03

## 2019-10-01 RX ORDER — SODIUM CHLORIDE 9 MG/ML
10 INJECTION INTRAMUSCULAR; INTRAVENOUS; SUBCUTANEOUS
Refills: 0 | Status: DISCONTINUED | OUTPATIENT
Start: 2019-10-01 | End: 2019-10-03

## 2019-10-01 RX ADMIN — Medication 0.25 MILLIGRAM(S): at 22:33

## 2019-10-01 RX ADMIN — Medication 250 MILLIGRAM(S): at 19:38

## 2019-10-01 RX ADMIN — Medication 1 TABLET(S): at 11:42

## 2019-10-01 RX ADMIN — Medication 25 MILLIGRAM(S): at 05:26

## 2019-10-01 RX ADMIN — Medication 250 MILLIGRAM(S): at 05:27

## 2019-10-01 RX ADMIN — GABAPENTIN 300 MILLIGRAM(S): 400 CAPSULE ORAL at 22:33

## 2019-10-01 RX ADMIN — Medication 1 MILLIGRAM(S): at 11:42

## 2019-10-01 NOTE — PROGRESS NOTE ADULT - ASSESSMENT
73y female with hx ovarian CA, prior chemo, s/p resection, connie- and splenectomy, has R chest port for many years, recent afib diagnosed in June of this year, s/p cardioversion with CHRISTI about two weeks ago presents with back pain.    PAF  - Currently maintaining NSR  - No cardiac symptoms  - Continue Toprol XL 25 QD.  HR and BP acceptable  - hold eliquis in preparation for port extraction/picc placement  - No evidence of abnormal bleeding  - echocardiogram with normal lv function, moderate AI. No evidence of endocarditis, and AI is unchanged per daughter.    Positive cultures  - Follow up ID  - Abx per primary team  - Suspicion for endocarditis low given recent CHRISTI with no vegetation done at [facility]  - obtain report if possible    No evidence of acute ischemia, volume overload, or uncontrolled arrythmia  Monitor and replete lytes as needed  To follow closely with you while admitted

## 2019-10-01 NOTE — PROGRESS NOTE ADULT - ASSESSMENT
pt with above history p/w back pain , + blood cx as out pt, as per ortho likely T12 Osteo as per outside imaging     Back pain - MRI with contrast WITH T12/ L1 discitis/ osteo, pain control, compression fracture +,     Bacteremia-  repeat blood cx , ID consult evaluated pt - started on Vancomycin 1g IV q12, adjust based on cr clearance and vanco level  , port removla today  & poss picc line placement     Afib - pt in sinus at present     -

## 2019-10-02 ENCOUNTER — TRANSCRIPTION ENCOUNTER (OUTPATIENT)
Age: 74
End: 2019-10-02

## 2019-10-02 LAB
ANION GAP SERPL CALC-SCNC: 10 MMOL/L — SIGNIFICANT CHANGE UP (ref 5–17)
BASOPHILS # BLD AUTO: 0.05 K/UL — SIGNIFICANT CHANGE UP (ref 0–0.2)
BASOPHILS NFR BLD AUTO: 0.7 % — SIGNIFICANT CHANGE UP (ref 0–2)
BUN SERPL-MCNC: 12 MG/DL — SIGNIFICANT CHANGE UP (ref 7–23)
CALCIUM SERPL-MCNC: 8.7 MG/DL — SIGNIFICANT CHANGE UP (ref 8.4–10.5)
CHLORIDE SERPL-SCNC: 102 MMOL/L — SIGNIFICANT CHANGE UP (ref 96–108)
CO2 SERPL-SCNC: 27 MMOL/L — SIGNIFICANT CHANGE UP (ref 22–31)
CREAT SERPL-MCNC: 0.62 MG/DL — SIGNIFICANT CHANGE UP (ref 0.5–1.3)
CULTURE RESULTS: SIGNIFICANT CHANGE UP
EOSINOPHIL # BLD AUTO: 0.11 K/UL — SIGNIFICANT CHANGE UP (ref 0–0.5)
EOSINOPHIL NFR BLD AUTO: 1.5 % — SIGNIFICANT CHANGE UP (ref 0–6)
GLUCOSE SERPL-MCNC: 87 MG/DL — SIGNIFICANT CHANGE UP (ref 70–99)
GRAM STN FLD: SIGNIFICANT CHANGE UP
HCT VFR BLD CALC: 36.4 % — SIGNIFICANT CHANGE UP (ref 34.5–45)
HGB BLD-MCNC: 11.6 G/DL — SIGNIFICANT CHANGE UP (ref 11.5–15.5)
IMM GRANULOCYTES NFR BLD AUTO: 0.1 % — SIGNIFICANT CHANGE UP (ref 0–1.5)
INR BLD: 1.09 RATIO — SIGNIFICANT CHANGE UP (ref 0.88–1.16)
LYMPHOCYTES # BLD AUTO: 2.71 K/UL — SIGNIFICANT CHANGE UP (ref 1–3.3)
LYMPHOCYTES # BLD AUTO: 37.1 % — SIGNIFICANT CHANGE UP (ref 13–44)
MCHC RBC-ENTMCNC: 29.9 PG — SIGNIFICANT CHANGE UP (ref 27–34)
MCHC RBC-ENTMCNC: 31.9 GM/DL — LOW (ref 32–36)
MCV RBC AUTO: 93.8 FL — SIGNIFICANT CHANGE UP (ref 80–100)
MONOCYTES # BLD AUTO: 0.78 K/UL — SIGNIFICANT CHANGE UP (ref 0–0.9)
MONOCYTES NFR BLD AUTO: 10.7 % — SIGNIFICANT CHANGE UP (ref 2–14)
NEUTROPHILS # BLD AUTO: 3.65 K/UL — SIGNIFICANT CHANGE UP (ref 1.8–7.4)
NEUTROPHILS NFR BLD AUTO: 49.9 % — SIGNIFICANT CHANGE UP (ref 43–77)
PLATELET # BLD AUTO: 220 K/UL — SIGNIFICANT CHANGE UP (ref 150–400)
POTASSIUM SERPL-MCNC: 3.9 MMOL/L — SIGNIFICANT CHANGE UP (ref 3.5–5.3)
POTASSIUM SERPL-SCNC: 3.9 MMOL/L — SIGNIFICANT CHANGE UP (ref 3.5–5.3)
PROTHROM AB SERPL-ACNC: 12.5 SEC — SIGNIFICANT CHANGE UP (ref 10–13.1)
RBC # BLD: 3.88 M/UL — SIGNIFICANT CHANGE UP (ref 3.8–5.2)
RBC # FLD: 13.8 % — SIGNIFICANT CHANGE UP (ref 10.3–14.5)
SODIUM SERPL-SCNC: 139 MMOL/L — SIGNIFICANT CHANGE UP (ref 135–145)
SPECIMEN SOURCE: SIGNIFICANT CHANGE UP
VANCOMYCIN TROUGH SERPL-MCNC: 15.4 UG/ML — SIGNIFICANT CHANGE UP (ref 10–20)
WBC # BLD: 7.31 K/UL — SIGNIFICANT CHANGE UP (ref 3.8–10.5)
WBC # FLD AUTO: 7.31 K/UL — SIGNIFICANT CHANGE UP (ref 3.8–10.5)

## 2019-10-02 PROCEDURE — 99232 SBSQ HOSP IP/OBS MODERATE 35: CPT

## 2019-10-02 RX ORDER — VANCOMYCIN HCL 1 G
750 VIAL (EA) INTRAVENOUS
Qty: 72 | Refills: 0
Start: 2019-10-02 | End: 2019-11-06

## 2019-10-02 RX ORDER — LANOLIN ALCOHOL/MO/W.PET/CERES
3 CREAM (GRAM) TOPICAL ONCE
Refills: 0 | Status: COMPLETED | OUTPATIENT
Start: 2019-10-02 | End: 2019-10-02

## 2019-10-02 RX ADMIN — GABAPENTIN 300 MILLIGRAM(S): 400 CAPSULE ORAL at 22:36

## 2019-10-02 RX ADMIN — Medication 1 TABLET(S): at 11:57

## 2019-10-02 RX ADMIN — Medication 1 MILLIGRAM(S): at 11:57

## 2019-10-02 RX ADMIN — CHLORHEXIDINE GLUCONATE 1 APPLICATION(S): 213 SOLUTION TOPICAL at 06:41

## 2019-10-02 RX ADMIN — Medication 3 MILLIGRAM(S): at 23:16

## 2019-10-02 RX ADMIN — Medication 250 MILLIGRAM(S): at 18:15

## 2019-10-02 RX ADMIN — Medication 250 MILLIGRAM(S): at 06:37

## 2019-10-02 RX ADMIN — Medication 0.25 MILLIGRAM(S): at 22:36

## 2019-10-02 RX ADMIN — Medication 25 MILLIGRAM(S): at 06:44

## 2019-10-02 NOTE — PROVIDER CONTACT NOTE (CRITICAL VALUE NOTIFICATION) - ACTION/TREATMENT ORDERED:
pt on vanco already
No new interventions needed at this time per NP.

## 2019-10-02 NOTE — PROVIDER CONTACT NOTE (CRITICAL VALUE NOTIFICATION) - BACKGROUND
ovarian ca, afeb, [facility] [place]
pt on vanco IV already
ovarian ca, afeb, [facility] [place]
Pt came in for bacteremia and osteomylitis, on IV vancomycin. Mediport removal and PICC line placed on left upper arm on 10/1/2019.

## 2019-10-02 NOTE — DISCHARGE NOTE NURSING/CASE MANAGEMENT/SOCIAL WORK - PATIENT PORTAL LINK FT
You can access the FollowMyHealth Patient Portal offered by [facility] by registering at the following website: [email or web address]. By joining [facility]’s FollowMyHealth portal, you will also be able to view your health information using other applications (apps) compatible with our system.

## 2019-10-02 NOTE — PROGRESS NOTE ADULT - ASSESSMENT
73y female with hx ovarian CA, prior chemo, s/p resection, connie- and splenectomy, has R chest port for many years, recent afib diagnosed in June of this year, s/p cardioversion with CHRISTI about two weeks ago presents with back pain.    PAF  - Currently maintaining NSR  - No cardiac symptoms  - Continue Toprol XL 25 QD.  HR and BP acceptable  - resume eliquis when able now that port is extracted and PICC placed.   - No evidence of abnormal bleeding  - echocardiogram with normal lv function, moderate AI. No evidence of endocarditis, and AI is unchanged per daughter.    Positive cultures  - Follow up ID  - Abx per primary team  - Suspicion for endocarditis low given recent CHRISTI with no vegetation done at [facility]  - obtain report if possible    No evidence of acute ischemia, volume overload, or uncontrolled arrythmia  Monitor and replete lytes as needed  To follow closely with you while admitted

## 2019-10-02 NOTE — PROVIDER CONTACT NOTE (CRITICAL VALUE NOTIFICATION) - SITUATION
blood culture: collected on 09/26 Gram + cocci cluster in anaerobic bottle
blood culture: collected on 09/26 Gram + cocci cluster in aerobic bottle
blood culture: collected on 09/26 Gram + cocci cluster in aerobic bottle
Blood culture from 9/29/2019 (prelim results) growth in anaerobic bottle with gram positive cocci in clusters.

## 2019-10-02 NOTE — PROVIDER CONTACT NOTE (CRITICAL VALUE NOTIFICATION) - TEST AND RESULT REPORTED:
Blood culture from 9/29/2019 (prelim results) growth in anaerobic bottle with gram positive cocci in clusters.
blood culture: Gram + cocci  in cluster in aerobic bottle
blood culture: Gram + cocci cluster in aerobic bottle
blood culture: Gram + cocci  in cluster in anaerobic bottle

## 2019-10-02 NOTE — PROGRESS NOTE ADULT - ASSESSMENT
pt with above history p/w back pain , + blood cx as out pt, as per ortho likely T12 Osteo as per outside imaging     Back pain - MRI with contrast WITH T12/ L1 discitis/ osteo, pain control, compression fracture +,     Bacteremia-  repeat blood cx , ID consult evaluated pt - started on Vancomycin 1g IV q12, adjust based on cr clearance and vanco level  , s/p port removal&  picc line placement     Afib - pt in sinus at present     -

## 2019-10-02 NOTE — PROVIDER CONTACT NOTE (CRITICAL VALUE NOTIFICATION) - RECOMMENDATIONS
pt is on [facility]
NP aware
pt is on [facility]
Continue current antibiotic regimen.

## 2019-10-03 ENCOUNTER — INBOUND DOCUMENT (OUTPATIENT)
Age: 74
End: 2019-10-03

## 2019-10-03 VITALS
SYSTOLIC BLOOD PRESSURE: 99 MMHG | OXYGEN SATURATION: 96 % | HEART RATE: 73 BPM | TEMPERATURE: 98 F | RESPIRATION RATE: 18 BRPM | DIASTOLIC BLOOD PRESSURE: 60 MMHG

## 2019-10-03 LAB
ANION GAP SERPL CALC-SCNC: 10 MMOL/L — SIGNIFICANT CHANGE UP (ref 5–17)
BUN SERPL-MCNC: 14 MG/DL — SIGNIFICANT CHANGE UP (ref 7–23)
CALCIUM SERPL-MCNC: 9 MG/DL — SIGNIFICANT CHANGE UP (ref 8.4–10.5)
CHLORIDE SERPL-SCNC: 104 MMOL/L — SIGNIFICANT CHANGE UP (ref 96–108)
CO2 SERPL-SCNC: 27 MMOL/L — SIGNIFICANT CHANGE UP (ref 22–31)
CREAT SERPL-MCNC: 0.65 MG/DL — SIGNIFICANT CHANGE UP (ref 0.5–1.3)
GLUCOSE SERPL-MCNC: 91 MG/DL — SIGNIFICANT CHANGE UP (ref 70–99)
HCT VFR BLD CALC: 36.4 % — SIGNIFICANT CHANGE UP (ref 34.5–45)
HGB BLD-MCNC: 11.5 G/DL — SIGNIFICANT CHANGE UP (ref 11.5–15.5)
MCHC RBC-ENTMCNC: 29.8 PG — SIGNIFICANT CHANGE UP (ref 27–34)
MCHC RBC-ENTMCNC: 31.6 GM/DL — LOW (ref 32–36)
MCV RBC AUTO: 94.3 FL — SIGNIFICANT CHANGE UP (ref 80–100)
PLATELET # BLD AUTO: 210 K/UL — SIGNIFICANT CHANGE UP (ref 150–400)
POTASSIUM SERPL-MCNC: 3.9 MMOL/L — SIGNIFICANT CHANGE UP (ref 3.5–5.3)
POTASSIUM SERPL-SCNC: 3.9 MMOL/L — SIGNIFICANT CHANGE UP (ref 3.5–5.3)
RBC # BLD: 3.86 M/UL — SIGNIFICANT CHANGE UP (ref 3.8–5.2)
RBC # FLD: 14 % — SIGNIFICANT CHANGE UP (ref 10.3–14.5)
SODIUM SERPL-SCNC: 141 MMOL/L — SIGNIFICANT CHANGE UP (ref 135–145)
WBC # BLD: 7.18 K/UL — SIGNIFICANT CHANGE UP (ref 3.8–10.5)
WBC # FLD AUTO: 7.18 K/UL — SIGNIFICANT CHANGE UP (ref 3.8–10.5)

## 2019-10-03 PROCEDURE — C1769: CPT

## 2019-10-03 PROCEDURE — 72149 MRI LUMBAR SPINE W/DYE: CPT

## 2019-10-03 PROCEDURE — C1887: CPT

## 2019-10-03 PROCEDURE — C1751: CPT

## 2019-10-03 PROCEDURE — 36573 INSJ PICC RS&I 5 YR+: CPT

## 2019-10-03 PROCEDURE — 87070 CULTURE OTHR SPECIMN AEROBIC: CPT

## 2019-10-03 PROCEDURE — 96374 THER/PROPH/DIAG INJ IV PUSH: CPT

## 2019-10-03 PROCEDURE — 36590 REMOVAL TUNNELED CV CATH: CPT

## 2019-10-03 PROCEDURE — 83735 ASSAY OF MAGNESIUM: CPT

## 2019-10-03 PROCEDURE — 85652 RBC SED RATE AUTOMATED: CPT

## 2019-10-03 PROCEDURE — 93306 TTE W/DOPPLER COMPLETE: CPT

## 2019-10-03 PROCEDURE — 80202 ASSAY OF VANCOMYCIN: CPT

## 2019-10-03 PROCEDURE — 80048 BASIC METABOLIC PNL TOTAL CA: CPT

## 2019-10-03 PROCEDURE — 84443 ASSAY THYROID STIM HORMONE: CPT

## 2019-10-03 PROCEDURE — 86803 HEPATITIS C AB TEST: CPT

## 2019-10-03 PROCEDURE — 85027 COMPLETE CBC AUTOMATED: CPT

## 2019-10-03 PROCEDURE — 87040 BLOOD CULTURE FOR BACTERIA: CPT

## 2019-10-03 PROCEDURE — 99285 EMERGENCY DEPT VISIT HI MDM: CPT | Mod: 25

## 2019-10-03 PROCEDURE — 85610 PROTHROMBIN TIME: CPT

## 2019-10-03 PROCEDURE — 77001 FLUOROGUIDE FOR VEIN DEVICE: CPT

## 2019-10-03 PROCEDURE — 99232 SBSQ HOSP IP/OBS MODERATE 35: CPT

## 2019-10-03 PROCEDURE — 86140 C-REACTIVE PROTEIN: CPT

## 2019-10-03 PROCEDURE — 80053 COMPREHEN METABOLIC PANEL: CPT

## 2019-10-03 PROCEDURE — 87186 SC STD MICRODIL/AGAR DIL: CPT

## 2019-10-03 RX ORDER — APIXABAN 2.5 MG/1
5 TABLET, FILM COATED ORAL
Refills: 0 | Status: DISCONTINUED | OUTPATIENT
Start: 2019-10-03 | End: 2019-10-03

## 2019-10-03 RX ADMIN — Medication 25 MILLIGRAM(S): at 06:29

## 2019-10-03 RX ADMIN — Medication 1 TABLET(S): at 12:21

## 2019-10-03 RX ADMIN — Medication 1 MILLIGRAM(S): at 12:21

## 2019-10-03 RX ADMIN — CHLORHEXIDINE GLUCONATE 1 APPLICATION(S): 213 SOLUTION TOPICAL at 06:29

## 2019-10-03 RX ADMIN — Medication 250 MILLIGRAM(S): at 06:28

## 2019-10-03 RX ADMIN — Medication 0.25 MILLIGRAM(S): at 12:21

## 2019-10-03 NOTE — PROGRESS NOTE ADULT - ASSESSMENT
73y female with hx ovarian CA, prior chemo, s/p resection, connie- and splenectomy, has R chest port for many years, recent afib diagnosed in June of this year, s/p cardioversion with CHRISTI about two weeks ago presents with back pain.       - Currently maintaining NSR  - No cardiac symptoms  - Continue Toprol XL 25 QD.  HR and BP acceptable  - resume eliquis when able now that port is extracted and PICC placed.   - No evidence of abnormal bleeding  - echocardiogram with normal lv function, moderate AI. No evidence of endocarditis, and AI is unchanged       - abx as outpt per ID  - Suspicion for endocarditis low given recent CHRISTI with no vegetation done at [facility]      No evidence of acute ischemia, volume overload, or uncontrolled arrythmia  Monitor and replete lytes as needed  To follow closely with you while admitted  dc planning for today

## 2019-10-03 NOTE — CHART NOTE - NSCHARTNOTEFT_GEN_A_CORE
Request from Dr. Luna to facilitate patient discharge.  Medication reconciliation reviewed, revised, and resolved with Dr. Luna, who has medically cleared patient for discharge with follow up as advised.  Please refer to discharge note for detailed hospital course.

## 2019-10-03 NOTE — PROGRESS NOTE ADULT - PROVIDER SPECIALTY LIST ADULT
Cardiology
Infectious Disease
Internal Medicine
Intervent Radiology
Intervent Radiology
Cardiology

## 2019-10-03 NOTE — PROGRESS NOTE ADULT - REASON FOR ADMISSION
back pain

## 2019-10-03 NOTE — PROGRESS NOTE ADULT - SUBJECTIVE AND OBJECTIVE BOX
[facility] - Vanessa Caballero, Patti, Guru, Irene, João Zhao  Office Number:  [phone]    Patient resting comfortably in bed in NAD.  Laying flat with no respiratory distress.  No complaints of chest pain, dyspnea, palpitations, PND, or orthopnea.    F/U for:  AF      MEDICATIONS  (STANDING):  apixaban 5 milliGRAM(s) Oral two times a day  docusate sodium 100 milliGRAM(s) Oral three times a day  folic acid 1 milliGRAM(s) Oral daily  gabapentin 300 milliGRAM(s) Oral at bedtime  metoprolol succinate ER 25 milliGRAM(s) Oral daily  multivitamin 1 Tablet(s) Oral daily  ondansetron Injectable 4 milliGRAM(s) IV Push once  polyethylene glycol 3350 17 Gram(s) Oral daily  senna 2 Tablet(s) Oral at bedtime  vancomycin  IVPB 1000 milliGRAM(s) IV Intermittent every 12 hours    MEDICATIONS  (PRN):  acetaminophen   Tablet .. 650 milliGRAM(s) Oral every 6 hours PRN Temp greater or equal to 38C (100.4F), Mild Pain (1 - 3)  ALPRAZolam 0.25 milliGRAM(s) Oral three times a day PRN anxiety  oxyCODONE    5 mG/acetaminophen 325 mG 1 Tablet(s) Oral every 6 hours PRN Moderate Pain (4 - 6)      Allergies    niacin (Anaphylaxis; Rash)    Intolerances        Vital Signs Last 24 Hrs  T(C): 36.2 (30 Sep 2019 04:59), Max: 36.6 (29 Sep 2019 15:15)  T(F): 97.2 (30 Sep 2019 04:59), Max: 97.9 (29 Sep 2019 15:15)  HR: 60 (30 Sep 2019 04:59) (60 - 66)  BP: 101/50 (30 Sep 2019 04:59) (101/48 - 101/54)  BP(mean): --  RR: 17 (30 Sep 2019 04:59) (17 - 17)  SpO2: 96% (30 Sep 2019 04:59) (95% - 96%)    I&O's Summary    29 Sep 2019 07:01  -  30 Sep 2019 07:00  --------------------------------------------------------  IN: 490 mL / OUT: 0 mL / NET: 490 mL        ON EXAM:    General: NAD, awake and alert, oriented x 3  HEENT: Mucous membranes are moist, anicteric  Lungs: Non-labored, breath sounds are clear bilaterally, No wheezing, rales or rhonchi  Cardiovascular: Regular, S1 and S2, no murmurs, rubs, or gallops  Gastrointestinal: Bowel Sounds present, soft, nontender.   Lymph: No peripheral edema. No lymphadenopathy.  Skin: No rashes or ulcers  Psych:  Mood & affect appropriate    LABS: All Labs Reviewed:                        12.0   6.57  )-----------( 222      ( 29 Sep 2019 09:26 )             37.7                         12.2   8.14  )-----------( 238      ( 27 Sep 2019 12:50 )             38.6     29 Sep 2019 06:59    138    |  102    |  12     ----------------------------<  97     3.9     |  27     |  0.71   27 Sep 2019 09:20    138    |  101    |  10     ----------------------------<  89     3.7     |  26     |  0.65     Ca    9.1        29 Sep 2019 06:59  Ca    9.0        27 Sep 2019 09:20    TPro  6.9    /  Alb  3.4    /  TBili  0.4    /  DBili  x      /  AST  23     /  ALT  19     /  AlkPhos  79     27 Sep 2019 09:20          Blood Culture: Organism --  Gram Stain Blood -- Gram Stain   Growth in aerobic bottle: Gram Positive Cocci in Clusters  Growth in anaerobic bottle: Gram Positive Cocci in Clusters  Specimen Source .Blood  Culture-Blood --    Organism --  Gram Stain Blood -- Gram Stain   Growth in aerobic bottle: Gram Positive Cocci in Clusters  Growth in anaerobic bottle: Gram Positive Cocci in Clusters  Specimen Source .Blood  Culture-Blood --        09-27 @ 13:37  TSH: 3.99
CC: f/u for  staph epi bacteremia and spine infection  Patient reports  she is anxious to get her port removed  REVIEW OF SYSTEMS:  All other review of systems negative (Comprehensive ROS)    Antimicrobials Day #  :6/42  vancomycin  IVPB 1000 milliGRAM(s) IV Intermittent every 12 hours    Other Medications Reviewed    T(F): 97.8 (10-01-19 @ 12:50), Max: 98.1 (09-30-19 @ 21:08)  HR: 64 (10-01-19 @ 12:50)  BP: 122/68 (10-01-19 @ 12:50)  RR: 17 (10-01-19 @ 12:50)  SpO2: 96% (10-01-19 @ 12:50)  Wt(kg): --    PHYSICAL EXAM:  General: alert, no acute distress  Eyes:  anicteric, no conjunctival injection, no discharge  Oropharynx: no lesions or injection 	  Neck: supple, without adenopathy  Lungs: clear to auscultation  Heart: regular rate and rhythm; no murmur, rubs or gallops  Abdomen: soft, nondistended, nontender, without mass or organomegaly  Skin: no lesions  Extremities: no clubbing, cyanosis, or edema  Neurologic: alert, oriented, moves all extremities    LAB RESULTS:                        12.1   6.74  )-----------( 231      ( 01 Oct 2019 08:04 )             38.3     10-01    138  |  102  |  10  ----------------------------<  104<H>  4.1   |  27  |  0.69    Ca    9.0      01 Oct 2019 07:01  Mg     1.9     10-01    TPro  6.5  /  Alb  3.3  /  TBili  0.4  /  DBili  x   /  AST  28  /  ALT  21  /  AlkPhos  86  10-01    LIVER FUNCTIONS - ( 01 Oct 2019 07:01 )  Alb: 3.3 g/dL / Pro: 6.5 g/dL / ALK PHOS: 86 U/L / ALT: 21 U/L / AST: 28 U/L / GGT: x             MICROBIOLOGY:  RECENT CULTURES:  09-29 @ 13:03 .Blood     No growth to date.      09-27 @ 00:29 .Blood     Growth in aerobic and anaerobic bottles: Staphylococcus epidermidis  "Susceptibilities not performed"    Growth in aerobic bottle: Gram Positive Cocci in Clusters  Growth in anaerobic bottle: Gram Positive Cocci in Clusters    09-26 @ 16:37 .Blood     Growth in aerobic and anaerobic bottles: Gram Positive Cocci in Clusters  Identification to follow.    Growth in aerobic bottle: Gram Positive Cocci in Clusters  Growth in anaerobic bottle: Gram Positive Cocci in Clusters        RADIOLOGY REVIEWED:    < from: MR Lumbar Spine w/ IV Cont (09.27.19 @ 13:36) >  EXAM:  MR SPINE LUMBAR IC                            PROCEDURE DATE:  09/27/2019            INTERPRETATION:  CLINICAL INFORMATION: Back pain. Positive blood cultures   as outpatient. Likely T12 osteomyelitis-discitis as per outside imaging.    TECHNIQUE: MRI of the lumbar spine was performed without and with   intravenous contrast. 10 cc of Gadavist intravenous contrast was   administered to the patient. 0 cc was discarded.    COMPARISON: None available in this hospital system.    FINDINGS: There is increased signal in the T12 and L1 vertebral bodies on   T2 fat-saturated images with decreased signal on T1-weighted images.   There is also associated abnormal bony enhancement. There is erosion of   the T12 inferior endplate and L1 superior endplate with overriding of the   L1 vertebral body into the T12 vertebral body. There is complete   destruction of the intervertebral disc space at this level. There is also   surrounding abnormal paravertebral enhancement anterolaterally. There is   no associated epidural abscess or conus medullaris compression.     There is moderate retrolisthesis of T12 on L1, mild retrolisthesis of L1   on L2 and Grade 1 anterolisthesis of L4 on L5. Other scattered vertebral   body T2 fat-saturated hyperintensities represent hemangiomas. There is a   probable atypical hemangioma within the posterior aspect of the S1   vertebral body.    The conus medullaris is normal in signal and position. It terminates at   the T12-L1 interspace. There is no abnormal thickening or enhancement of   the cauda equina nerve roots. There are lobular structures in the sacral   spinal canal representing Tarlov cysts. 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    L1-L2: There is mild retrolisthesis with uncovering of the posterior disc   annulus. Desiccated disc with minimal loss of height posteriorly. A small   annular fissure is noted within the central and left central zones of the   canal. There is a bulging disc which minimally deforms the ventral thecal   sac. There is a superimposed disc protrusion which spans the right   central through foraminal zones of the canal without nerve root contact   or impingement. Mild facet arthropathy is notable. There is ligamentum   flavum redundancy. There is overall no canalstenosis. There is also   minimal bilateral foraminal narrowing.    L2-L3: Desiccated disc without loss of height. There is a bulging disc   which minimally deforms the ventral thecal sac. Mild bilateral facet   joint arthrosis. Mild segmental flavumredundancy is noted. No canal or   foraminal narrowing.    L3-L4: Desiccated disc without loss of height. A concentric annular   fissure spanning the central through the left central zones of the canal.   A bulging disc is notable with a superimposeddisc protrusion which spans   the central through left subarticular zones of the canal. There is no   nerve root contact or impingement. Mild facet arthropathy and ligament   flavum redundancy are demonstrated. No significant canal stenosis or   neural foraminal narrowing.    L4-L5: There is grade 2 anterolisthesis with uncovering of the posterior   disc annulus. Desiccated disc with mild loss of height. A bulging disc is   noted which deforms the ventral thecal sac. Severe bilateral facet   arthropathy is notable along with ligamentum flavum redundancy. There is   overall moderate to severe canal stenosis with associated aggregation of   the cauda equina nerve roots.    L5-S1: Desiccated disc. There is a focal annular fissure within the left  subarticular zone of the canal. A bulging disc is noted which contacts   the descending nerve roots. There is also contact of the exiting   right-sided L4 nerve root in the foraminal zone of the canal. Moderate   facet arthropathy is noted. No canal stenosis or neural foraminal   narrowing.    Limited evaluation of the visualized intra-abdominal cavity demonstrates   hepatic cysts and bilateral renal cysts.     IMPRESSION:    T12-L1 discitis/osteomyelitis with surrounding paravertebral phlegmon. No   epidural abscess, conus medullaris or cauda equina compression.    Degenerative changes of the spine as above.    < from: Transthoracic Echocardiogram (09.30.19 @ 07:22) >  Patient name: ANTHONY GALINDO  YOB: 1945   Age: 73 (F)   MR#: [id number]  Study Date: 9/30/2019  Location: [address] [place], [place] [id number]: Annie Castellano RDCS  Study quality: Technically fair  Referring Physician: Madan Luna MD  BloodPressure: 101/50 mmHg  Height: 163 cm  Weight: 54 kg  BSA: 1.6 m2  ------------------------------------------------------------------------  PROCEDURE: Transthoracic echocardiogram with 2-D, M-Mode  and complete spectral and color flow Doppler.  INDICATION: Bacteremia (R78.81)  ------------------------------------------------------------------------  Dimensions:    Normal Values:  LA:     3.8    2.0 - 4.0 cm  Ao:     3.2    2.0 - 3.8 cm  SEPTUM: 0.7    0.6 - 1.2 cm  PWT:    0.7    0.6 - 1.1 cm  LVIDd:  5.4    3.0 - 5.6 cm  LVIDs:  3.4    1.8 - 4.0 cm  Derived variables:  LVMI: 84 g/m2  RWT: 0.25  EF (Visual Estimate): 65 %  Doppler Peak Velocity (m/sec): AoV=1.4  ------------------------------------------------------------------------  Observations:  Mitral Valve: Posterior mitral leaflet prolapse. Minimal  mitral regurgitation.  Aortic Valve/Aorta: Calcified aortic valve with normal  opening. Moderate aortic regurgitation.  Normal aortic root size.  Left Atrium: Normal left atrium.  LeftVentricle: Normal left ventricular internal dimensions  and wall thicknesses.  Normal left ventricular systolic function. No segmental  wall motion abnormalities.  Impaired LV-relaxation with normal filling pressure.  Right Heart: Normal right atrium. Normal right ventricular  size and function. Normal tricuspid valve. Minimal  tricuspid regurgitation. Normal pulmonic valve.  Pericardium/Pleura: Normal pericardium with no pericardial  effusion.  Hemodynamic: No evidence of pulmonary hypertension.  No PFO seen with color Doppler.  ------------------------------------------------------------------------  Conclusions:  Normal left ventricular systolic function. No segmental  wall motion abnormalities.  Moderate aortic regurgitation.  ------------------------------------------------------------------------  Confirmed on  9/30/2019 - 10:35:33 by Ismael Moreno M.D.  ------------------------------------------------------------------------    < end of copied text >        Assessment:  Patient with history of ovarian ca, has port for years, developed severe back pain and now known to have high grade sustained coag neg staph bacteremia presumed source the port. She had recent afib cardioversion with [facility] and had AI on the exam and on current TTE  Plan:  continue iv vancomycin  await planned port pull  picc once cultures finalized negative  i would favor [facility]
CC: f/u for  staph epi bacteremia and spine osteo  Patient reports  a little pain in the back  REVIEW OF SYSTEMS:  All other review of systems negative (Comprehensive ROS)    Antimicrobials Day #  :7/42  vancomycin  IVPB 1000 milliGRAM(s) IV Intermittent every 12 hours    Other Medications Reviewed    T(F): 98.8 (10-02-19 @ 13:50), Max: 98.8 (10-02-19 @ 13:50)  HR: 81 (10-02-19 @ 13:50)  BP: 106/57 (10-02-19 @ 13:50)  RR: 18 (10-02-19 @ 13:50)  SpO2: 96% (10-02-19 @ 13:50)  Wt(kg): --    PHYSICAL EXAM:  General: alert, no acute distress  Eyes:  anicteric, no conjunctival injection, no discharge  Oropharynx: no lesions or injection 	  Neck: supple, without adenopathy  Lungs: clear to auscultation  Heart: regular rate and rhythm; no murmur, rubs or gallops  Abdomen: soft, nondistended, nontender, without mass or organomegaly  Skin: no lesions  Extremities: no clubbing, cyanosis, or edema  Neurologic: alert, oriented, moves all extremities    LAB RESULTS:                        11.6   7.31  )-----------( 220      ( 02 Oct 2019 09:04 )             36.4     10-02    139  |  102  |  12  ----------------------------<  87  3.9   |  27  |  0.62    Ca    8.7      02 Oct 2019 07:34  Mg     1.9     10-01    TPro  6.5  /  Alb  3.3  /  TBili  0.4  /  DBili  x   /  AST  28  /  ALT  21  /  AlkPhos  86  10-01    LIVER FUNCTIONS - ( 01 Oct 2019 07:01 )  Alb: 3.3 g/dL / Pro: 6.5 g/dL / ALK PHOS: 86 U/L / ALT: 21 U/L / AST: 28 U/L / GGT: x             MICROBIOLOGY:  RECENT CULTURES:  09-29 @ 13:03 .Blood     Growth in anaerobic bottle: Gram Positive Cocci in Clusters    Growth in anaerobic bottle: Gram Positive Cocci in Clusters        RADIOLOGY REVIEWED:      < from: MR Lumbar Spine w/ IV Cont (09.27.19 @ 13:36) >  EXAM:  MR SPINE LUMBAR IC                            PROCEDURE DATE:  09/27/2019            INTERPRETATION:  CLINICAL INFORMATION: Back pain. Positive blood cultures   as outpatient. Likely T12 osteomyelitis-discitis as per outside imaging.    TECHNIQUE: MRI of the lumbar spine was performed without and with   intravenous contrast. 10 cc of Gadavist intravenous contrast was   administered to the patient. 0 cc was discarded.    COMPARISON: None available in this hospital system.    FINDINGS: There is increased signal in the T12 and L1 vertebral bodies on   T2 fat-saturated images with decreased signal on T1-weighted images.   There is also associated abnormal bony enhancement. There is erosion of   the T12 inferior endplate and L1 superior endplate with overriding of the   L1 vertebral body into the T12 vertebral body. There is complete   destruction of the intervertebral disc space at this level. There is also   surrounding abnormal paravertebral enhancement anterolaterally. There is   no associated epidural abscess or conus medullaris compression.     There is moderate retrolisthesis of T12 on L1, mild retrolisthesis of L1   on L2 and Grade 1 anterolisthesis of L4 on L5. Other scattered vertebral   body T2 fat-saturated hyperintensities represent hemangiomas. There is a   probable atypical hemangioma within the posterior aspect of the S1   vertebral body.    The conus medullaris is normal in signal and position. It terminates at   the T12-L1 interspace. There is no abnormal thickening or enhancement of   the cauda equina nerve roots. There are lobular structures in the sacral   spinal canal representing Tarlov cysts. 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    L1-L2: There is mild retrolisthesis with uncovering of the posterior disc   annulus. Desiccated disc with minimal loss of height posteriorly. A small   annular fissure is noted within the central and left central zones of the   canal. There is a bulging disc which minimally deforms the ventral thecal   sac. There is a superimposed disc protrusion which spans the right   central through foraminal zones of the canal without nerve root contact   or impingement. Mild facet arthropathy is notable. There is ligamentum   flavum redundancy. There is overall no canalstenosis. There is also   minimal bilateral foraminal narrowing.    L2-L3: Desiccated disc without loss of height. There is a bulging disc   which minimally deforms the ventral thecal sac. Mild bilateral facet   joint arthrosis. Mild segmental flavumredundancy is noted. No canal or   foraminal narrowing.    L3-L4: Desiccated disc without loss of height. A concentric annular   fissure spanning the central through the left central zones of the canal.   A bulging disc is notable with a superimposeddisc protrusion which spans   the central through left subarticular zones of the canal. There is no   nerve root contact or impingement. Mild facet arthropathy and ligament   flavum redundancy are demonstrated. No significant canal stenosis or   neural foraminal narrowing.    L4-L5: There is grade 2 anterolisthesis with uncovering of the posterior   disc annulus. Desiccated disc with mild loss of height. A bulging disc is   noted which deforms the ventral thecal sac. Severe bilateral facet   arthropathy is notable along with ligamentum flavum redundancy. There is   overall moderate to severe canal stenosis with associated aggregation of   the cauda equina nerve roots.    L5-S1: Desiccated disc. There is a focal annular fissure within the left  subarticular zone of the canal. A bulging disc is noted which contacts   the descending nerve roots. There is also contact of the exiting   right-sided L4 nerve root in the foraminal zone of the canal. Moderate   facet arthropathy is noted. No canal stenosis or neural foraminal   narrowing.    Limited evaluation of the visualized intra-abdominal cavity demonstrates   hepatic cysts and bilateral renal cysts.     IMPRESSION:    T12-L1 discitis/osteomyelitis with surrounding paravertebral phlegmon. No   epidural abscess, conus medullaris or cauda equina compression.    Degenerative changes of the spine as above.        < end of copied text >      Assessment:  Patient with staph epi bacteremia from port presumed with T12 L1 discitis and phlegmon. Now that port is pulled, bacteremia should be cleared  Plan:  complete 5 more weeks of iv vanco  repeat blood cultures  follow up port culture  weekly cbc with dif, bmp and vanco tr  f/u in office in 1-2 weeks
CC: f/u for  staph epideremidis bacteremia and spine infection  Patient reports  she feels well, looks forward to discharge  REVIEW OF SYSTEMS:  All other review of systems negative (Comprehensive ROS)    Antimicrobials Day #  :8/42  vancomycin  IVPB 1000 milliGRAM(s) IV Intermittent every 12 hours    Other Medications Reviewed    T(F): 97.8 (10-03-19 @ 14:45), Max: 97.9 (10-02-19 @ 21:22)  HR: 73 (10-03-19 @ 14:45)  BP: 99/60 (10-03-19 @ 14:45)  RR: 18 (10-03-19 @ 14:45)  SpO2: 96% (10-03-19 @ 14:45)  Wt(kg): --    PHYSICAL EXAM:  General: alert, no acute distress  Eyes:  anicteric, no conjunctival injection, no discharge  Oropharynx: no lesions or injection 	  Neck: supple, without adenopathy  Lungs: clear to auscultation  Heart: regular rate and rhythm; no murmur, rubs or gallops  Abdomen: soft, nondistended, nontender, without mass or organomegaly  Skin: no lesions  Extremities: no clubbing, cyanosis, or edema  Neurologic: alert, oriented, moves all extremities    LAB RESULTS:                        11.5   7.18  )-----------( 210      ( 03 Oct 2019 08:53 )             36.4     10-03    141  |  104  |  14  ----------------------------<  91  3.9   |  27  |  0.65    Ca    9.0      03 Oct 2019 06:22          MICROBIOLOGY:  RECENT CULTURES:  10-01 @ 21:18 .Catheter right side port     No growth to date.      09-29 @ 13:03 .Blood     Growth in anaerobic bottle: Gram Positive Cocci in Clusters    Growth in anaerobic bottle: Gram Positive Cocci in Clusters        RADIOLOGY REVIEWED:    < from: MR Lumbar Spine w/ IV Cont (09.27.19 @ 13:36) >  XAM:  MR SPINE LUMBAR IC                            PROCEDURE DATE:  09/27/2019            INTERPRETATION:  CLINICAL INFORMATION: Back pain. Positive blood cultures   as outpatient. Likely T12 osteomyelitis-discitis as per outside imaging.    TECHNIQUE: MRI of the lumbar spine was performed without and with   intravenous contrast. 10 cc of Gadavist intravenous contrast was   administered to the patient. 0 cc was discarded.    COMPARISON: None available in this hospital system.    FINDINGS: There is increased signal in the T12 and L1 vertebral bodies on   T2 fat-saturated images with decreased signal on T1-weighted images.   There is also associated abnormal bony enhancement. There is erosion of   the T12 inferior endplate and L1 superior endplate with overriding of the   L1 vertebral body into the T12 vertebral body. There is complete   destruction of the intervertebral disc space at this level. There is also   surrounding abnormal paravertebral enhancement anterolaterally. There is   no associated epidural abscess or conus medullaris compression.     There is moderate retrolisthesis of T12 on L1, mild retrolisthesis of L1   on L2 and Grade 1 anterolisthesis of L4 on L5. Other scattered vertebral   body T2 fat-saturated hyperintensities represent hemangiomas. There is a   probable atypical hemangioma within the posterior aspect of the S1   vertebral body.    The conus medullaris is normal in signal and position. It terminates at   the T12-L1 interspace. There is no abnormal thickening or enhancement of   the cauda equina nerve roots. There are lobular structures in the sacral   spinal canal representing Tarlov cysts. 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    L1-L2: There is mild retrolisthesis with uncovering of the posterior disc   annulus. Desiccated disc with minimal loss of height posteriorly. A small   annular fissure is noted within the central and left central zones of the   canal. There is a bulging disc which minimally deforms the ventral thecal   sac. There is a superimposed disc protrusion which spans the right   central through foraminal zones of the canal without nerve root contact   or impingement. Mild facet arthropathy is notable. There is ligamentum   flavum redundancy. There is overall no canalstenosis. There is also   minimal bilateral foraminal narrowing.    L2-L3: Desiccated disc without loss of height. There is a bulging disc   which minimally deforms the ventral thecal sac. Mild bilateral facet   joint arthrosis. Mild segmental flavumredundancy is noted. No canal or   foraminal narrowing.    L3-L4: Desiccated disc without loss of height. A concentric annular   fissure spanning the central through the left central zones of the canal.   A bulging disc is notable with a superimposeddisc protrusion which spans   the central through left subarticular zones of the canal. There is no   nerve root contact or impingement. Mild facet arthropathy and ligament   flavum redundancy are demonstrated. No significant canal stenosis or   neural foraminal narrowing.    L4-L5: There is grade 2 anterolisthesis with uncovering of the posterior   disc annulus. Desiccated disc with mild loss of height. A bulging disc is   noted which deforms the ventral thecal sac. Severe bilateral facet   arthropathy is notable along with ligamentum flavum redundancy. There is   overall moderate to severe canal stenosis with associated aggregation of   the cauda equina nerve roots.    L5-S1: Desiccated disc. There is a focal annular fissure within the left  subarticular zone of the canal. A bulging disc is noted which contacts   the descending nerve roots. There is also contact of the exiting   right-sided L4 nerve root in the foraminal zone of the canal. Moderate   facet arthropathy is noted. No canal stenosis or neural foraminal   narrowing.    Limited evaluation of the visualized intra-abdominal cavity demonstrates   hepatic cysts and bilateral renal cysts.     IMPRESSION:    T12-L1 discitis/osteomyelitis with surrounding paravertebral phlegmon. No   epidural abscess, conus medullaris or cauda equina compression.    Degenerative changes of the spine as above.      < end of copied text >      Assessment:  Patient with history of ovarian ca, port for years but no recent chemo and gets flushes developed back pain some months ago and is now known to have infection in spine and staph epi bacteremia, port now removed, repeat cultures pend. back feels better  Plan:  to finish another 34 days of iv vancomycin  follow up latest blood cultures and follow up in office in 1-2 weeks
CC: f/u for staph epi bacteremia and om, discitis    Patient reports back feels better    REVIEW OF SYSTEMS:  All other review of systems negative (Comprehensive ROS)    Antimicrobials Day #  :2  vancomycin  IVPB 1000 milliGRAM(s) IV Intermittent every 12 hours    Other Medications Reviewed    T(F): 98.3 (09-27-19 @ 12:21), Max: 98.7 (09-26-19 @ 17:30)  HR: 62 (09-27-19 @ 12:21)  BP: 102/63 (09-27-19 @ 12:21)  RR: 18 (09-27-19 @ 12:21)  SpO2: 94% (09-27-19 @ 12:21)  Wt(kg): --    PHYSICAL EXAM:  General: alert, no acute distress  Eyes:  anicteric, no conjunctival injection, no discharge  Oropharynx: no lesions or injection 	  Neck: supple, without adenopathy  Lungs: clear to auscultation  Heart: regular rate and rhythm; no murmur, rubs or gallops  Abdomen: soft, nondistended, nontender, without mass or organomegaly  Skin: no lesions  Extremities: no clubbing, cyanosis, or edema  Neurologic: alert, oriented, moves all extremities    LAB RESULTS:                        12.2   8.14  )-----------( 238      ( 27 Sep 2019 12:50 )             38.6     09-27    138  |  101  |  10  ----------------------------<  89  3.7   |  26  |  0.65    Ca    9.0      27 Sep 2019 09:20    TPro  6.9  /  Alb  3.4  /  TBili  0.4  /  DBili  x   /  AST  23  /  ALT  19  /  AlkPhos  79  09-27    LIVER FUNCTIONS - ( 27 Sep 2019 09:20 )  Alb: 3.4 g/dL / Pro: 6.9 g/dL / ALK PHOS: 79 U/L / ALT: 19 U/L / AST: 23 U/L / GGT: x             MICROBIOLOGY:  RECENT CULTURES:      RADIOLOGY REVIEWED:  < from: MR Lumbar Spine w/ IV Cont (09.27.19 @ 13:36) >    EXAM:  MR SPINE LUMBAR IC                            PROCEDURE DATE:  09/27/2019            INTERPRETATION:  CLINICAL INFORMATION: Back pain. Positive blood cultures   as outpatient. Likely T12 osteomyelitis-discitis as per outside imaging.    TECHNIQUE: MRI of the lumbar spine was performed without and with   intravenous contrast. 10 cc of Gadavist intravenous contrast was   administered to the patient. 0 cc was discarded.    COMPARISON: None available in this hospital system.    FINDINGS: There is increased signal in the T12 and L1 vertebral bodies on   T2 fat-saturated images with decreased signal on T1-weighted images.   There is also associated abnormal bony enhancement. There is erosion of   the T12 inferior endplate and L1 superior endplate with overriding of the   L1 vertebral body into the T12 vertebral body. There is complete   destruction of the intervertebral disc space at this level. There is also   surrounding abnormal paravertebral enhancement anterolaterally. There is   no associated epidural abscess or conus medullaris compression.     There is moderate retrolisthesis of T12 on L1, mild retrolisthesis of L1   on L2 and Grade 1 anterolisthesis of L4 on L5. Other scattered vertebral   body T2 fat-saturated hyperintensities represent hemangiomas. There is a   probable atypical hemangioma within the posterior aspect of the S1   vertebral body.    The conus medullaris is normal in signal and position. It terminates at   the T12-L1 interspace. There is no abnormal thickening or enhancement of   the cauda equina nerve roots. There are lobular structures in the sacral   spinal canal representing Tarlov cysts. 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    L1-L2: There is mild retrolisthesis with uncovering of the posterior disc   annulus. Desiccated disc with minimal loss of height posteriorly. A small   annular fissure is noted within the central and left central zones of the   canal. There is a bulging disc which minimally deforms the ventral thecal   sac. There is a superimposed disc protrusion which spans the right   central through foraminal zones of the canal without nerve root contact   or impingement. Mild facet arthropathy is notable. There is ligamentum   flavum redundancy. There is overall no canalstenosis. There is also   minimal bilateral foraminal narrowing.    L2-L3: Desiccated disc without loss of height. There is a bulging disc   which minimally deforms the ventral thecal sac. Mild bilateral facet   joint arthrosis. Mild segmental flavumredundancy is noted. No canal or   foraminal narrowing.    L3-L4: Desiccated disc without loss of height. A concentric annular   fissure spanning the central through the left central zones of the canal.   A bulging disc is notable with a superimposeddisc protrusion which spans   the central through left subarticular zones of the canal. There is no   nerve root contact or impingement. Mild facet arthropathy and ligament   flavum redundancy are demonstrated. No significant canal stenosis or   neural foraminal narrowing.    L4-L5: There is grade 2 anterolisthesis with uncovering of the posterior   disc annulus. Desiccated disc with mild loss of height. A bulging disc is   noted which deforms the ventral thecal sac. Severe bilateral facet   arthropathy is notable along with ligamentum flavum redundancy. There is   overall moderate to severe canal stenosis with associated aggregation of   the cauda equina nerve roots.    L5-S1: Desiccated disc. There is a focal annular fissure within the left  subarticular zone of the canal. A bulging disc is noted which contacts   the descending nerve roots. There is also contact of the exiting   right-sided L4 nerve root in the foraminal zone of the canal. Moderate   facet arthropathy is noted. No canal stenosis or neural foraminal   narrowing.    Limited evaluation of the visualized intra-abdominal cavity demonstrates   hepatic cysts and bilateral renal cysts.     IMPRESSION:    T12-L1 discitis/osteomyelitis with surrounding paravertebral phlegmon. No   epidural abscess, conus medullaris or cauda equina compression.    Degenerative changes of the spine as above.    < end of copied text >        Assessment:  Patient with h/o splenectomy , ovarian ca s/p chemo with persistent port, developed back pain 3 months ago , had a course of po steroids and PT no better, had afib s/p CHRISTI 2 weeks ago and cardioversion with by report aortic insufficiency. She saw ortho spine who looked at recent spine imaging and saw om, discitis, had blood cultures drawn on 2 successive days now both days cultures are growing coag neg staph. New mri shows om, discitis and phlegmon. Source of bacteremia is likely the port. She also could have endocarditis.   Plan:  continue vancomycin, check trough before next dose  follow up latest cultures  remove port since she doesnt need more chemo and high grade bacteremia  CHRISTI  Ortho spine follows  will need eventual picc once bacteremia is controlled
CC: f/u for staph epi bacteremia and om, discitis    Patient reports back feels better    REVIEW OF SYSTEMS:  All other review of systems negative (Comprehensive ROS)    Antimicrobials Day #  :3  vancomycin  IVPB 1000 milliGRAM(s) IV Intermittent every 12 hours    Other Medications Reviewed    Vital Signs Last 24 Hrs  T(C): 36.3 (28 Sep 2019 05:11), Max: 36.8 (27 Sep 2019 12:21)  T(F): 97.3 (28 Sep 2019 05:11), Max: 98.3 (27 Sep 2019 12:21)  HR: 63 (28 Sep 2019 05:11) (62 - 65)  BP: 99/56 (28 Sep 2019 05:11) (99/47 - 102/63)  BP(mean): --  RR: 17 (28 Sep 2019 05:11) (17 - 18)  SpO2: 93% (28 Sep 2019 05:11) (93% - 95%)    PHYSICAL EXAM:  General: alert, no acute distress  Eyes:  anicteric, no conjunctival injection, no discharge  Oropharynx: no lesions or injection 	  Neck: supple, without adenopathy  Lungs: clear to auscultation  Heart: regular rate and rhythm; no murmur, rubs or gallops  Abdomen: soft, nondistended, nontender, without mass or organomegaly  Skin: no lesions  Extremities: no clubbing, cyanosis, or edema  Neurologic: alert, oriented, moves all extremities    LAB RESULTS:                                      12.2   8.14  )-----------( 238      ( 27 Sep 2019 12:50 )             38.6   09-27    138  |  101  |  10  ----------------------------<  89  3.7   |  26  |  0.65    Ca    9.0      27 Sep 2019 09:20    TPro  6.9  /  Alb  3.4  /  TBili  0.4  /  DBili  x   /  AST  23  /  ALT  19  /  AlkPhos  79  09-27      MICROBIOLOGY:  RECENT CULTURES:    Culture - Blood (09.27.19 @ 00:29)    Specimen Source: .Blood    Culture Results:   No growth to date.      RADIOLOGY REVIEWED:  < from: MR Lumbar Spine w/ IV Cont (09.27.19 @ 13:36) >    EXAM:  MR SPINE LUMBAR IC                            PROCEDURE DATE:  09/27/2019            INTERPRETATION:  CLINICAL INFORMATION: Back pain. Positive blood cultures   as outpatient. Likely T12 osteomyelitis-discitis as per outside imaging.    TECHNIQUE: MRI of the lumbar spine was performed without and with   intravenous contrast. 10 cc of Gadavist intravenous contrast was   administered to the patient. 0 cc was discarded.    COMPARISON: None available in this hospital system.    FINDINGS: There is increased signal in the T12 and L1 vertebral bodies on   T2 fat-saturated images with decreased signal on T1-weighted images.   There is also associated abnormal bony enhancement. There is erosion of   the T12 inferior endplate and L1 superior endplate with overriding of the   L1 vertebral body into the T12 vertebral body. There is complete   destruction of the intervertebral disc space at this level. There is also   surrounding abnormal paravertebral enhancement anterolaterally. There is   no associated epidural abscess or conus medullaris compression.     There is moderate retrolisthesis of T12 on L1, mild retrolisthesis of L1   on L2 and Grade 1 anterolisthesis of L4 on L5. Other scattered vertebral   body T2 fat-saturated hyperintensities represent hemangiomas. There is a   probable atypical hemangioma within the posterior aspect of the S1   vertebral body.    The conus medullaris is normal in signal and position. It terminates at   the T12-L1 interspace. There is no abnormal thickening or enhancement of   the cauda equina nerve roots. There are lobular structures in the sacral   spinal canal representing Tarlov cysts. 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    L1-L2: There is mild retrolisthesis with uncovering of the posterior disc   annulus. Desiccated disc with minimal loss of height posteriorly. A small   annular fissure is noted within the central and left central zones of the   canal. There is a bulging disc which minimally deforms the ventral thecal   sac. There is a superimposed disc protrusion which spans the right   central through foraminal zones of the canal without nerve root contact   or impingement. Mild facet arthropathy is notable. There is ligamentum   flavum redundancy. There is overall no canalstenosis. There is also   minimal bilateral foraminal narrowing.    L2-L3: Desiccated disc without loss of height. There is a bulging disc   which minimally deforms the ventral thecal sac. Mild bilateral facet   joint arthrosis. Mild segmental flavumredundancy is noted. No canal or   foraminal narrowing.    L3-L4: Desiccated disc without loss of height. A concentric annular   fissure spanning the central through the left central zones of the canal.   A bulging disc is notable with a superimposeddisc protrusion which spans   the central through left subarticular zones of the canal. There is no   nerve root contact or impingement. Mild facet arthropathy and ligament   flavum redundancy are demonstrated. No significant canal stenosis or   neural foraminal narrowing.    L4-L5: There is grade 2 anterolisthesis with uncovering of the posterior   disc annulus. Desiccated disc with mild loss of height. A bulging disc is   noted which deforms the ventral thecal sac. Severe bilateral facet   arthropathy is notable along with ligamentum flavum redundancy. There is   overall moderate to severe canal stenosis with associated aggregation of   the cauda equina nerve roots.    L5-S1: Desiccated disc. There is a focal annular fissure within the left  subarticular zone of the canal. A bulging disc is noted which contacts   the descending nerve roots. There is also contact of the exiting   right-sided L4 nerve root in the foraminal zone of the canal. Moderate   facet arthropathy is noted. No canal stenosis or neural foraminal   narrowing.    Limited evaluation of the visualized intra-abdominal cavity demonstrates   hepatic cysts and bilateral renal cysts.     IMPRESSION:    T12-L1 discitis/osteomyelitis with surrounding paravertebral phlegmon. No   epidural abscess, conus medullaris or cauda equina compression.    Degenerative changes of the spine as above.    < end of copied text >
CC: f/u for staph epi bacteremia and om, discitis    Patient reports back feels better    REVIEW OF SYSTEMS:  All other review of systems negative (Comprehensive ROS)    Antimicrobials Day #  :4  vancomycin  IVPB 1000 milliGRAM(s) IV Intermittent every 12 hours    Other Medications Reviewed    Vital Signs Last 24 Hrs  T(C): 36.4 (29 Sep 2019 04:50), Max: 36.6 (28 Sep 2019 21:06)  T(F): 97.6 (29 Sep 2019 04:50), Max: 97.9 (28 Sep 2019 21:06)  HR: 63 (29 Sep 2019 04:50) (60 - 69)  BP: 100/58 (29 Sep 2019 04:50) (97/58 - 105/51)  BP(mean): --  RR: 17 (29 Sep 2019 04:50) (17 - 17)  SpO2: 96% (29 Sep 2019 04:50) (95% - 96%)    PHYSICAL EXAM:  General: alert, no acute distress  Eyes:  anicteric, no conjunctival injection, no discharge  Oropharynx: no lesions or injection 	  Neck: supple, without adenopathy  Lungs: clear to auscultation  Heart: regular rate and rhythm; no murmur, rubs or gallops  Abdomen: soft, nondistended, nontender, without mass or organomegaly  Skin: no lesions  Extremities: no clubbing, cyanosis, or edema  Neurologic: alert, oriented, moves all extremities    LAB RESULTS:                                                 12.2   8.14  )-----------( 238      ( 27 Sep 2019 12:50 )             38.6   09-29    138  |  102  |  12  ----------------------------<  97  3.9   |  27  |  0.71    Ca    9.1      29 Sep 2019 06:59    TPro  6.9  /  Alb  3.4  /  TBili  0.4  /  DBili  x   /  AST  23  /  ALT  19  /  AlkPhos  79  09-27      MICROBIOLOGY:  RECENT CULTURES:    Culture - Blood (09.26.19 @ 16:37)    Gram Stain:   Growth in aerobic bottle: Gram Positive Cocci in Clusters    Specimen Source: .Blood    Culture Results:   Growth in aerobic bottle: Gram Positive Cocci in Clusters    Culture - Blood (09.27.19 @ 00:29)    Gram Stain:   Growth in anaerobic bottle: Gram Positive Cocci in Clusters  Growth in aerobic bottle: Gram Positive Cocci in Clusters    Specimen Source: .Blood    Culture Results:   Growth in anaerobic bottle: Gram Positive Cocci in Clusters  Growth in aerobic bottle: Gram Positive Cocci in Clusters          RADIOLOGY REVIEWED:  < from: MR Lumbar Spine w/ IV Cont (09.27.19 @ 13:36) >    EXAM:  MR SPINE LUMBAR IC                            PROCEDURE DATE:  09/27/2019            INTERPRETATION:  CLINICAL INFORMATION: Back pain. Positive blood cultures   as outpatient. Likely T12 osteomyelitis-discitis as per outside imaging.    TECHNIQUE: MRI of the lumbar spine was performed without and with   intravenous contrast. 10 cc of Gadavist intravenous contrast was   administered to the patient. 0 cc was discarded.    COMPARISON: None available in this hospital system.    FINDINGS: There is increased signal in the T12 and L1 vertebral bodies on   T2 fat-saturated images with decreased signal on T1-weighted images.   There is also associated abnormal bony enhancement. There is erosion of   the T12 inferior endplate and L1 superior endplate with overriding of the   L1 vertebral body into the T12 vertebral body. There is complete   destruction of the intervertebral disc space at this level. There is also   surrounding abnormal paravertebral enhancement anterolaterally. There is   no associated epidural abscess or conus medullaris compression.     There is moderate retrolisthesis of T12 on L1, mild retrolisthesis of L1   on L2 and Grade 1 anterolisthesis of L4 on L5. Other scattered vertebral   body T2 fat-saturated hyperintensities represent hemangiomas. There is a   probable atypical hemangioma within the posterior aspect of the S1   vertebral body.    The conus medullaris is normal in signal and position. It terminates at   the T12-L1 interspace. There is no abnormal thickening or enhancement of   the cauda equina nerve roots. There are lobular structures in the sacral   spinal canal representing Tarlov cysts. 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    L1-L2: There is mild retrolisthesis with uncovering of the posterior disc   annulus. Desiccated disc with minimal loss of height posteriorly. A small   annular fissure is noted within the central and left central zones of the   canal. There is a bulging disc which minimally deforms the ventral thecal   sac. There is a superimposed disc protrusion which spans the right   central through foraminal zones of the canal without nerve root contact   or impingement. Mild facet arthropathy is notable. There is ligamentum   flavum redundancy. There is overall no canalstenosis. There is also   minimal bilateral foraminal narrowing.    L2-L3: Desiccated disc without loss of height. There is a bulging disc   which minimally deforms the ventral thecal sac. Mild bilateral facet   joint arthrosis. Mild segmental flavumredundancy is noted. No canal or   foraminal narrowing.    L3-L4: Desiccated disc without loss of height. A concentric annular   fissure spanning the central through the left central zones of the canal.   A bulging disc is notable with a superimposeddisc protrusion which spans   the central through left subarticular zones of the canal. There is no   nerve root contact or impingement. Mild facet arthropathy and ligament   flavum redundancy are demonstrated. No significant canal stenosis or   neural foraminal narrowing.    L4-L5: There is grade 2 anterolisthesis with uncovering of the posterior   disc annulus. Desiccated disc with mild loss of height. A bulging disc is   noted which deforms the ventral thecal sac. Severe bilateral facet   arthropathy is notable along with ligamentum flavum redundancy. There is   overall moderate to severe canal stenosis with associated aggregation of   the cauda equina nerve roots.    L5-S1: Desiccated disc. There is a focal annular fissure within the left  subarticular zone of the canal. A bulging disc is noted which contacts   the descending nerve roots. There is also contact of the exiting   right-sided L4 nerve root in the foraminal zone of the canal. Moderate   facet arthropathy is noted. No canal stenosis or neural foraminal   narrowing.    Limited evaluation of the visualized intra-abdominal cavity demonstrates   hepatic cysts and bilateral renal cysts.     IMPRESSION:    T12-L1 discitis/osteomyelitis with surrounding paravertebral phlegmon. No   epidural abscess, conus medullaris or cauda equina compression.    Degenerative changes of the spine as above.    < end of copied text >
CC: f/u for staph epi bacteremia and om, discitis    Patient reports back feels better    REVIEW OF SYSTEMS:  All other review of systems negative (Comprehensive ROS)    Antimicrobials Day #  :5  vancomycin  IVPB 1000 milliGRAM(s) IV Intermittent every 12 hours    Other Medications Reviewed    Vital Signs Last 24 Hrs  T(C): 36.4 (30 Sep 2019 12:24), Max: 36.6 (29 Sep 2019 20:53)  T(F): 97.6 (30 Sep 2019 12:24), Max: 97.8 (29 Sep 2019 20:53)  HR: 62 (30 Sep 2019 12:24) (60 - 62)  BP: 108/55 (30 Sep 2019 12:24) (101/50 - 108/55)  BP(mean): --  RR: 17 (30 Sep 2019 12:24) (17 - 17)  SpO2: 97% (30 Sep 2019 12:24) (96% - 97%)    PHYSICAL EXAM:  General: alert, no acute distress  Eyes:  anicteric, no conjunctival injection, no discharge  Oropharynx: no lesions or injection 	  Neck: supple, without adenopathy  Lungs: clear to auscultation  Heart: regular rate and rhythm; no murmur, rubs or gallops  Abdomen: soft, nondistended, nontender, without mass or organomegaly  Skin: no lesions  Extremities: no clubbing, cyanosis, or edema  Neurologic: alert, oriented, moves all extremities    LAB RESULTS:                                                   12.0   6.57  )-----------( 222      ( 29 Sep 2019 09:26 )             37.7   09-29    138  |  102  |  12  ----------------------------<  97  3.9   |  27  |  0.71    Ca    9.1      29 Sep 2019 06:59        Ca    9.1      29 Sep 2019 06:59    TPro  6.9  /  Alb  3.4  /  TBili  0.4  /  DBili  x   /  AST  23  /  ALT  19  /  AlkPhos  79  09-27    Vancomycin Level, Trough: 17.8          MICROBIOLOGY:  RECENT CULTURES:    Culture - Blood (09.29.19 @ 13:03)    Specimen Source: .Blood    Culture Results:   No growth to date.    Culture - Blood (09.27.19 @ 00:29)    Gram Stain:   Growth in anaerobic bottle: Gram Positive Cocci in Clusters  Growth in aerobic bottle: Gram Positive Cocci in Clusters    Specimen Source: .Blood    Culture Results:   Growth in aerobic and anaerobic bottles: Coag Negative Staphylococcus  Single set isolate, possible contaminant. Contact  Microbiology if susceptibility testing clinically  indicated.        RADIOLOGY REVIEWED:  < from: MR Lumbar Spine w/ IV Cont (09.27.19 @ 13:36) >    EXAM:  MR SPINE LUMBAR IC                            PROCEDURE DATE:  09/27/2019            INTERPRETATION:  CLINICAL INFORMATION: Back pain. Positive blood cultures   as outpatient. Likely T12 osteomyelitis-discitis as per outside imaging.    TECHNIQUE: MRI of the lumbar spine was performed without and with   intravenous contrast. 10 cc of Gadavist intravenous contrast was   administered to the patient. 0 cc was discarded.    COMPARISON: None available in this hospital system.    FINDINGS: There is increased signal in the T12 and L1 vertebral bodies on   T2 fat-saturated images with decreased signal on T1-weighted images.   There is also associated abnormal bony enhancement. There is erosion of   the T12 inferior endplate and L1 superior endplate with overriding of the   L1 vertebral body into the T12 vertebral body. There is complete   destruction of the intervertebral disc space at this level. There is also   surrounding abnormal paravertebral enhancement anterolaterally. There is   no associated epidural abscess or conus medullaris compression.     There is moderate retrolisthesis of T12 on L1, mild retrolisthesis of L1   on L2 and Grade 1 anterolisthesis of L4 on L5. Other scattered vertebral   body T2 fat-saturated hyperintensities represent hemangiomas. There is a   probable atypical hemangioma within the posterior aspect of the S1   vertebral body.    The conus medullaris is normal in signal and position. It terminates at   the T12-L1 interspace. There is no abnormal thickening or enhancement of   the cauda equina nerve roots. There are lobular structures in the sacral   spinal canal representing Tarlov cysts. 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    L1-L2: There is mild retrolisthesis with uncovering of the posterior disc   annulus. Desiccated disc with minimal loss of height posteriorly. A small   annular fissure is noted within the central and left central zones of the   canal. There is a bulging disc which minimally deforms the ventral thecal   sac. There is a superimposed disc protrusion which spans the right   central through foraminal zones of the canal without nerve root contact   or impingement. Mild facet arthropathy is notable. There is ligamentum   flavum redundancy. There is overall no canalstenosis. There is also   minimal bilateral foraminal narrowing.    L2-L3: Desiccated disc without loss of height. There is a bulging disc   which minimally deforms the ventral thecal sac. Mild bilateral facet   joint arthrosis. Mild segmental flavumredundancy is noted. No canal or   foraminal narrowing.    L3-L4: Desiccated disc without loss of height. A concentric annular   fissure spanning the central through the left central zones of the canal.   A bulging disc is notable with a superimposeddisc protrusion which spans   the central through left subarticular zones of the canal. There is no   nerve root contact or impingement. Mild facet arthropathy and ligament   flavum redundancy are demonstrated. No significant canal stenosis or   neural foraminal narrowing.    L4-L5: There is grade 2 anterolisthesis with uncovering of the posterior   disc annulus. Desiccated disc with mild loss of height. A bulging disc is   noted which deforms the ventral thecal sac. Severe bilateral facet   arthropathy is notable along with ligamentum flavum redundancy. There is   overall moderate to severe canal stenosis with associated aggregation of   the cauda equina nerve roots.    L5-S1: Desiccated disc. There is a focal annular fissure within the left  subarticular zone of the canal. A bulging disc is noted which contacts   the descending nerve roots. There is also contact of the exiting   right-sided L4 nerve root in the foraminal zone of the canal. Moderate   facet arthropathy is noted. No canal stenosis or neural foraminal   narrowing.    Limited evaluation of the visualized intra-abdominal cavity demonstrates   hepatic cysts and bilateral renal cysts.     IMPRESSION:    T12-L1 discitis/osteomyelitis with surrounding paravertebral phlegmon. No   epidural abscess, conus medullaris or cauda equina compression.    Degenerative changes of the spine as above.    < end of copied text >
[facility] - Vanessa Caballero, Patti, Guru, Irene, João Zhao  Office Number:  [phone]    Patient resting comfortably in bed in NAD.  Laying flat with no respiratory distress.  No complaints of chest pain, dyspnea, palpitations, PND, or orthopnea.    ROS: negative unless otherwise mentioned.    Telemetry:  off    MEDICATIONS  (STANDING):  docusate sodium 100 milliGRAM(s) Oral three times a day  folic acid 1 milliGRAM(s) Oral daily  gabapentin 300 milliGRAM(s) Oral at bedtime  metoprolol succinate ER 25 milliGRAM(s) Oral daily  multivitamin 1 Tablet(s) Oral daily  ondansetron Injectable 4 milliGRAM(s) IV Push once  polyethylene glycol 3350 17 Gram(s) Oral daily  senna 2 Tablet(s) Oral at bedtime  vancomycin  IVPB 1000 milliGRAM(s) IV Intermittent every 12 hours    MEDICATIONS  (PRN):  acetaminophen   Tablet .. 650 milliGRAM(s) Oral every 6 hours PRN Temp greater or equal to 38C (100.4F), Mild Pain (1 - 3)  ALPRAZolam 0.25 milliGRAM(s) Oral three times a day PRN anxiety  oxyCODONE    5 mG/acetaminophen 325 mG 1 Tablet(s) Oral every 6 hours PRN Moderate Pain (4 - 6)      Allergies    niacin (Anaphylaxis; Rash)    Intolerances        Vital Signs Last 24 Hrs  T(C): 36.4 (01 Oct 2019 05:15), Max: 36.7 (30 Sep 2019 21:08)  T(F): 97.6 (01 Oct 2019 05:15), Max: 98.1 (30 Sep 2019 21:08)  HR: 61 (01 Oct 2019 05:15) (61 - 69)  BP: 103/55 (01 Oct 2019 05:15) (103/55 - 108/55)  BP(mean): --  RR: 17 (01 Oct 2019 05:15) (17 - 17)  SpO2: 96% (01 Oct 2019 05:15) (96% - 97%)    I&O's Summary    30 Sep 2019 07:01  -  01 Oct 2019 07:00  --------------------------------------------------------  IN: 1010 mL / OUT: 0 mL / NET: 1010 mL        ON EXAM:    General: NAD, awake and alert, oriented x 3  HEENT: Mucous membranes are moist, anicteric  Lungs: Non-labored, breath sounds are clear bilaterally, No wheezing, rales or rhonchi  Cardiovascular: Regular, S1 and S2, no murmurs, rubs, or gallops  Gastrointestinal: Bowel Sounds present, soft, nontender.   Lymph: No peripheral edema. No lymphadenopathy.  Skin: No rashes or ulcers  Psych:  Mood & affect appropriate    LABS: All Labs Reviewed:                        12.1   6.74  )-----------( 231      ( 01 Oct 2019 08:04 )             38.3                         12.0   6.57  )-----------( 222      ( 29 Sep 2019 09:26 )             37.7     01 Oct 2019 07:01    138    |  102    |  10     ----------------------------<  104    4.1     |  27     |  0.69   29 Sep 2019 06:59    138    |  102    |  12     ----------------------------<  97     3.9     |  27     |  0.71     Ca    9.0        01 Oct 2019 07:01  Ca    9.1        29 Sep 2019 06:59  Mg     1.9       01 Oct 2019 07:01    TPro  6.5    /  Alb  3.3    /  TBili  0.4    /  DBili  x      /  AST  28     /  ALT  21     /  AlkPhos  86     01 Oct 2019 07:01    PT/INR - ( 30 Sep 2019 07:55 )   PT: 15.0 sec;   INR: 1.31 ratio               Blood Culture: Organism --  Gram Stain Blood -- Gram Stain --  Specimen Source .Blood  Culture-Blood --    Organism --  Gram Stain Blood -- Gram Stain   Growth in aerobic bottle: Gram Positive Cocci in Clusters  Growth in anaerobic bottle: Gram Positive Cocci in Clusters  Specimen Source .Blood  Culture-Blood --    Organism --  Gram Stain Blood -- Gram Stain   Growth in aerobic bottle: Gram Positive Cocci in Clusters  Growth in anaerobic bottle: Gram Positive Cocci in Clusters  Specimen Source .Blood  Culture-Blood --
[facility] -- Vanessa Caballero, Patti, Guru, Pavel Bonilla Savella  Office # [phone]      Follow Up:  PAF    Subjective/Observations: Patient seen and examined. Events noted. Resting comfortably in bed. No complaints of chest pain, dyspnea, or palpitations reported. No signs of orthopnea or PND. Tolerated procedure well from CV POV.       REVIEW OF SYSTEMS: All other review of systems is negative unless indicated above    PAST MEDICAL & SURGICAL HISTORY:  Back pain: spasms  Guillain-Kadoka  Hypertension  Ovarian cancer  Atrial fibrillation      MEDICATIONS  (STANDING):  chlorhexidine 4% Liquid 1 Application(s) Topical <User Schedule>  docusate sodium 100 milliGRAM(s) Oral three times a day  folic acid 1 milliGRAM(s) Oral daily  gabapentin 300 milliGRAM(s) Oral at bedtime  metoprolol succinate ER 25 milliGRAM(s) Oral daily  multivitamin 1 Tablet(s) Oral daily  ondansetron Injectable 4 milliGRAM(s) IV Push once  polyethylene glycol 3350 17 Gram(s) Oral daily  senna 2 Tablet(s) Oral at bedtime  vancomycin  IVPB 1000 milliGRAM(s) IV Intermittent every 12 hours    MEDICATIONS  (PRN):  acetaminophen   Tablet .. 650 milliGRAM(s) Oral every 6 hours PRN Temp greater or equal to 38C (100.4F), Mild Pain (1 - 3)  ALPRAZolam 0.25 milliGRAM(s) Oral three times a day PRN anxiety  oxyCODONE    5 mG/acetaminophen 325 mG 1 Tablet(s) Oral every 6 hours PRN Moderate Pain (4 - 6)  sodium chloride 0.9% lock flush 10 milliLiter(s) IV Push every 1 hour PRN Pre/post blood products, medications, blood draw, and to maintain line patency      Allergies    niacin (Anaphylaxis; Rash)    Intolerances            Vital Signs Last 24 Hrs  T(C): 36.8 (02 Oct 2019 04:32), Max: 36.8 (02 Oct 2019 04:32)  T(F): 98.2 (02 Oct 2019 04:32), Max: 98.2 (02 Oct 2019 04:32)  HR: 62 (02 Oct 2019 06:45) (49 - 65)  BP: 106/58 (02 Oct 2019 06:45) (89/47 - 122/68)  BP(mean): 71 (01 Oct 2019 20:00) (67 - 71)  RR: 18 (02 Oct 2019 04:32) (16 - 18)  SpO2: 96% (02 Oct 2019 04:32) (95% - 97%)    I&O's Summary    01 Oct 2019 07:01  -  02 Oct 2019 07:00  --------------------------------------------------------  IN: 840 mL / OUT: 0 mL / NET: 840 mL          PHYSICAL EXAM:     Constitutional: NAD, awake    HEENT: Moist Mucous Membranes, Anicteric  Pulmonary: Decreased breath sounds b/l. No rales, crackles or wheeze appreciated.   Cardiovascular: Regular, S1 and S2, No murmurs, rubs, gallops or clicks  Gastrointestinal: Bowel Sounds present, soft, nontender.   Lymph: No peripheral edema. No lymphadenopathy.  Skin: No visible rashes or ulcers.  Psych:  Mood & affect appropriate for situation    LABS: All Labs Reviewed:                        11.6   7.31  )-----------( 220      ( 02 Oct 2019 09:04 )             36.4                         12.1   6.74  )-----------( 231      ( 01 Oct 2019 08:04 )             38.3     02 Oct 2019 07:34    139    |  102    |  12     ----------------------------<  87     3.9     |  27     |  0.62   01 Oct 2019 07:01    138    |  102    |  10     ----------------------------<  104    4.1     |  27     |  0.69     Ca    8.7        02 Oct 2019 07:34  Ca    9.0        01 Oct 2019 07:01  Mg     1.9       01 Oct 2019 07:01    TPro  6.5    /  Alb  3.3    /  TBili  0.4    /  DBili  x      /  AST  28     /  ALT  21     /  AlkPhos  86     01 Oct 2019 07:01    PT/INR - ( 02 Oct 2019 09:09 )   PT: 12.5 sec;   INR: 1.09 ratio
Interventional Radiology Pre-Procedure Note    This is a 73y Female with bacteremia for port removal and PICC insertion    Procedure: port removal, PICC    Diagnosis/Indication: Patient is a 73y old  Female who presents with a chief complaint of back pain (01 Oct 2019 16:06)      PAST MEDICAL & SURGICAL HISTORY:  Back pain: spasms  Guillain-[place]terbury  Hypertension  Ovarian cancer  Atrial fibrillation       CBC Full  -  ( 01 Oct 2019 08:04 )  WBC Count : 6.74 K/uL  RBC Count : 4.00 M/uL  Hemoglobin : 12.1 g/dL  Hematocrit : 38.3 %  Platelet Count - Automated : 231 K/uL  Mean Cell Volume : 95.8 fl  Mean Cell Hemoglobin : 30.3 pg  Mean Cell Hemoglobin Concentration : 31.6 gm/dL  Auto Neutrophil # : 3.43 K/uL  Auto Lymphocyte # : 2.40 K/uL  Auto Monocyte # : 0.73 K/uL  Auto Eosinophil # : 0.10 K/uL  Auto Basophil # : 0.07 K/uL  Auto Neutrophil % : 51.0 %  Auto Lymphocyte % : 35.6 %  Auto Monocyte % : 10.8 %  Auto Eosinophil % : 1.5 %  Auto Basophil % : 1.0 %    10-01    138  |  102  |  10  ----------------------------<  104<H>  4.1   |  27  |  0.69    Ca    9.0      01 Oct 2019 07:01  Mg     1.9     10-01    TPro  6.5  /  Alb  3.3  /  TBili  0.4  /  DBili  x   /  AST  28  /  ALT  21  /  AlkPhos  86  10-01    PT/INR - ( 30 Sep 2019 07:55 )   PT: 15.0 sec;   INR: 1.31 ratio             Procedure/ risks/ benefits were explained, informed consent obtained from patient, verbalizes understanding.   Discussed case with medicine NP.
Interventional Radiology Procedure Note    Procedure:  Port removal, PICC line    Indication: bacteremia    Operators: faith    Anesthesia (type): IV sedation    Contrast: none    EBL: minimal    Findings/Follow up Plan of Care: 1) Port removed without incident.  Sent for culture.   2) Left brachial PICC line placed using US and fluoro guidance - tip in SVC.  May use immediately.  Full report to follow.    Specimens Removed: port    Implants: 40 cm, 4 [place] PICC    Complications: none    Condition/Disposition: PACU then floors.    Please call Interventional Radiology x [phone] with any questions, concerns, or issues.
[facility] - Vanessa Caballero, Patti, Guru, Irene, João Zhao  Office Number:  [phone]    Patient resting comfortably in bed in NAD.  Laying flat with no respiratory distress.  No complaints of chest pain, dyspnea, palpitations, PND, or orthopnea.    ROS: negative unless otherwise mentioned.    Telemetry:  off    MEDICATIONS  (STANDING):  apixaban 5 milliGRAM(s) Oral two times a day  folic acid 1 milliGRAM(s) Oral daily  gabapentin 300 milliGRAM(s) Oral at bedtime  metoprolol succinate ER 25 milliGRAM(s) Oral daily  multivitamin 1 Tablet(s) Oral daily  vancomycin  IVPB 1000 milliGRAM(s) IV Intermittent every 12 hours    MEDICATIONS  (PRN):  acetaminophen   Tablet .. 650 milliGRAM(s) Oral every 6 hours PRN Temp greater or equal to 38C (100.4F), Mild Pain (1 - 3)  ALPRAZolam 0.25 milliGRAM(s) Oral three times a day PRN anxiety  oxyCODONE    5 mG/acetaminophen 325 mG 1 Tablet(s) Oral every 6 hours PRN Moderate Pain (4 - 6)      Allergies    niacin (Anaphylaxis; Rash)    Intolerances        Vital Signs Last 24 Hrs  T(C): 36.5 (28 Sep 2019 11:55), Max: 36.8 (27 Sep 2019 21:12)  T(F): 97.7 (28 Sep 2019 11:55), Max: 98.3 (27 Sep 2019 21:12)  HR: 60 (28 Sep 2019 11:55) (60 - 65)  BP: 97/58 (28 Sep 2019 11:55) (97/58 - 99/56)  BP(mean): --  RR: 17 (28 Sep 2019 11:55) (17 - 18)  SpO2: 95% (28 Sep 2019 11:55) (93% - 95%)    I&O's Summary    27 Sep 2019 07:01  -  28 Sep 2019 07:00  --------------------------------------------------------  IN: 900 mL / OUT: 0 mL / NET: 900 mL    28 Sep 2019 07:01  -  28 Sep 2019 12:49  --------------------------------------------------------  IN: 240 mL / OUT: 0 mL / NET: 240 mL        ON EXAM:    Constitutional: NAD, alert and oriented x 3  Lungs:  Non-labored, breath sounds are clear bilaterally, No wheezing, rales or rhonchi  Cardiovascular: RRR.  S1 and S2 positive.  No murmurs, rubs, gallops or clicks  Gastrointestinal: Bowel Sounds present, soft, nontender.   Lymph: No peripheral edema. No cervical lymphadenopathy.  Neurological: Alert, no focal deficits  Skin: No rashes or ulcers   Psych:  Mood & affect appropriate.  LABS: All Labs Reviewed:                        12.2   8.14  )-----------( 238      ( 27 Sep 2019 12:50 )             38.6                         13.5   7.9   )-----------( 249      ( 26 Sep 2019 13:05 )             43.2     27 Sep 2019 09:20    138    |  101    |  10     ----------------------------<  89     3.7     |  26     |  0.65   26 Sep 2019 13:05    140    |  102    |  13     ----------------------------<  83     3.8     |  28     |  0.70     Ca    9.0        27 Sep 2019 09:20  Ca    9.3        26 Sep 2019 13:05    TPro  6.9    /  Alb  3.4    /  TBili  0.4    /  DBili  x      /  AST  23     /  ALT  19     /  AlkPhos  79     27 Sep 2019 09:20  TPro  8.0    /  Alb  4.0    /  TBili  0.3    /  DBili  x      /  AST  25     /  ALT  21     /  AlkPhos  97     26 Sep 2019 13:05          Blood Culture: Organism --  Gram Stain Blood -- Gram Stain --  Specimen Source .Blood  Culture-Blood --    Organism --  Gram Stain Blood -- Gram Stain --  Specimen Source .Blood  Culture-Blood --        09-27 @ 13:37  TSH: 3.99
[facility] - Vanessa Caballero, Patti, Guru, Irene, João Zhao  Office Number:  [phone]    Patient resting comfortably in bed in NAD.  Laying flat with no respiratory distress.  No complaints of chest pain, dyspnea, palpitations, PND, or orthopnea.    ROS: negative unless otherwise mentioned.    Telemetry:  off    MEDICATIONS  (STANDING):  apixaban 5 milliGRAM(s) Oral two times a day  folic acid 1 milliGRAM(s) Oral daily  gabapentin 300 milliGRAM(s) Oral at bedtime  metoprolol succinate ER 25 milliGRAM(s) Oral daily  multivitamin 1 Tablet(s) Oral daily  ondansetron Injectable 4 milliGRAM(s) IV Push once  vancomycin  IVPB 1000 milliGRAM(s) IV Intermittent every 12 hours    MEDICATIONS  (PRN):  acetaminophen   Tablet .. 650 milliGRAM(s) Oral every 6 hours PRN Temp greater or equal to 38C (100.4F), Mild Pain (1 - 3)  ALPRAZolam 0.25 milliGRAM(s) Oral three times a day PRN anxiety  oxyCODONE    5 mG/acetaminophen 325 mG 1 Tablet(s) Oral every 6 hours PRN Moderate Pain (4 - 6)      Allergies    niacin (Anaphylaxis; Rash)    Intolerances        Vital Signs Last 24 Hrs  T(C): 36.4 (29 Sep 2019 04:50), Max: 36.6 (28 Sep 2019 21:06)  T(F): 97.6 (29 Sep 2019 04:50), Max: 97.9 (28 Sep 2019 21:06)  HR: 63 (29 Sep 2019 04:50) (63 - 69)  BP: 100/58 (29 Sep 2019 04:50) (100/58 - 105/51)  BP(mean): --  RR: 17 (29 Sep 2019 04:50) (17 - 17)  SpO2: 96% (29 Sep 2019 04:50) (95% - 96%)    I&O's Summary    28 Sep 2019 07:01  -  29 Sep 2019 07:00  --------------------------------------------------------  IN: 440 mL / OUT: 0 mL / NET: 440 mL        ON EXAM:  Constitutional: NAD, alert and oriented x 3  Lungs:  Non-labored, breath sounds are clear bilaterally, No wheezing, rales or rhonchi  Cardiovascular: RRR.  S1 and S2 positive.  No murmurs, rubs, gallops or clicks  Gastrointestinal: Bowel Sounds present, soft, nontender.   Lymph: No peripheral edema. No cervical lymphadenopathy.  Neurological: Alert, no focal deficits  Skin: No rashes or ulcers   Psych:  Mood & affect appropriate.    LABS: All Labs Reviewed:                        12.0   6.57  )-----------( 222      ( 29 Sep 2019 09:26 )             37.7                         12.2   8.14  )-----------( 238      ( 27 Sep 2019 12:50 )             38.6     29 Sep 2019 06:59    138    |  102    |  12     ----------------------------<  97     3.9     |  27     |  0.71   27 Sep 2019 09:20    138    |  101    |  10     ----------------------------<  89     3.7     |  26     |  0.65     Ca    9.1        29 Sep 2019 06:59  Ca    9.0        27 Sep 2019 09:20    TPro  6.9    /  Alb  3.4    /  TBili  0.4    /  DBili  x      /  AST  23     /  ALT  19     /  AlkPhos  79     27 Sep 2019 09:20          Blood Culture: Organism --  Gram Stain Blood -- Gram Stain   Growth in aerobic bottle: Gram Positive Cocci in Clusters  Specimen Source .Blood  Culture-Blood --    Organism --  Gram Stain Blood -- Gram Stain   Growth in aerobic bottle: Gram Positive Cocci in Clusters  Growth in anaerobic bottle: Gram Positive Cocci in Clusters  Specimen Source .Blood  Culture-Blood --        09-27 @ 13:37  TSH: 3.99
SUBJECTIVE / OVERNIGHT EVENTS:pt says her back pain is much better able to move around without difficulty       MEDICATIONS  (STANDING):  apixaban 5 milliGRAM(s) Oral two times a day  folic acid 1 milliGRAM(s) Oral daily  gabapentin 300 milliGRAM(s) Oral at bedtime  metoprolol succinate ER 25 milliGRAM(s) Oral daily  multivitamin 1 Tablet(s) Oral daily  vancomycin  IVPB 1000 milliGRAM(s) IV Intermittent every 12 hours    MEDICATIONS  (PRN):  acetaminophen   Tablet .. 650 milliGRAM(s) Oral every 6 hours PRN Temp greater or equal to 38C (100.4F), Mild Pain (1 - 3)  ALPRAZolam 0.25 milliGRAM(s) Oral three times a day PRN anxiety  oxyCODONE    5 mG/acetaminophen 325 mG 1 Tablet(s) Oral every 6 hours PRN Moderate Pain (4 - 6)    Vital Signs Last 24 Hrs  T(C): 36.8 (27 Sep 2019 21:12), Max: 36.8 (27 Sep 2019 12:21)  T(F): 98.3 (27 Sep 2019 21:12), Max: 98.3 (27 Sep 2019 12:21)  HR: 65 (27 Sep 2019 21:12) (62 - 67)  BP: 99/47 (27 Sep 2019 21:12) (99/47 - 131/54)  BP(mean): --  RR: 18 (27 Sep 2019 21:12) (18 - 18)  SpO2: 95% (27 Sep 2019 21:12) (94% - 95%)    CAPILLARY BLOOD GLUCOSE        I&O's Summary    26 Sep 2019 07:01  -  27 Sep 2019 07:00  --------------------------------------------------------  IN: 360 mL / OUT: 0 mL / NET: 360 mL    27 Sep 2019 07:01  -  27 Sep 2019 23:05  --------------------------------------------------------  IN: 900 mL / OUT: 0 mL / NET: 900 mL        Constitutional: No fever, fatigue  Skin: No rash.  Eyes: No recent vision problems or eye pain.  ENT: No congestion, ear pain, or sore throat.  Cardiovascular: No chest pain or palpation.  Respiratory: No cough, shortness of breath, congestion, or wheezing.  Gastrointestinal: No abdominal pain, nausea, vomiting, or diarrhea.  Genitourinary: No dysuria.  Musculoskeletal: No joint swelling.  Neurologic: No headache.    PHYSICAL EXAM:  GENERAL: NAD  EYES: EOMI, PERRLA  NECK: Supple, No JVD  CHEST/LUNG: cta chao  HEART:  S1 , S2 +  ABDOMEN: soft ,bs+  EXTREMITIES:  no edema  NEUROLOGY:alert awake oriented       LABS:                        12.2   8.14  )-----------( 238      ( 27 Sep 2019 12:50 )             38.6     09-27    138  |  101  |  10  ----------------------------<  89  3.7   |  26  |  0.65    Ca    9.0      27 Sep 2019 09:20    TPro  6.9  /  Alb  3.4  /  TBili  0.4  /  DBili  x   /  AST  23  /  ALT  19  /  AlkPhos  79  09-27              RADIOLOGY & ADDITIONAL TESTS:    Imaging Personally Reviewed:    Consultant(s) Notes Reviewed:      Care Discussed with Consultants/Other Providers:
SUBJECTIVE / OVERNIGHT EVENTS:pt says her back pain is much better able to move around without difficulty     MEDICATIONS  (STANDING):  apixaban 5 milliGRAM(s) Oral two times a day  docusate sodium 100 milliGRAM(s) Oral three times a day  folic acid 1 milliGRAM(s) Oral daily  gabapentin 300 milliGRAM(s) Oral at bedtime  metoprolol succinate ER 25 milliGRAM(s) Oral daily  multivitamin 1 Tablet(s) Oral daily  ondansetron Injectable 4 milliGRAM(s) IV Push once  polyethylene glycol 3350 17 Gram(s) Oral daily  senna 2 Tablet(s) Oral at bedtime  vancomycin  IVPB 1000 milliGRAM(s) IV Intermittent every 12 hours    MEDICATIONS  (PRN):  acetaminophen   Tablet .. 650 milliGRAM(s) Oral every 6 hours PRN Temp greater or equal to 38C (100.4F), Mild Pain (1 - 3)  ALPRAZolam 0.25 milliGRAM(s) Oral three times a day PRN anxiety  oxyCODONE    5 mG/acetaminophen 325 mG 1 Tablet(s) Oral every 6 hours PRN Moderate Pain (4 - 6)    Vital Signs Last 24 Hrs  T(C): 36.6 (29 Sep 2019 20:53), Max: 36.6 (29 Sep 2019 15:15)  T(F): 97.8 (29 Sep 2019 20:53), Max: 97.9 (29 Sep 2019 15:15)  HR: 62 (29 Sep 2019 20:53) (62 - 66)  BP: 101/54 (29 Sep 2019 20:53) (100/58 - 101/54)  BP(mean): --  RR: 17 (29 Sep 2019 20:53) (17 - 17)  SpO2: 96% (29 Sep 2019 20:53) (95% - 96%)    Constitutional: No fever, fatigue  Skin: No rash.  Eyes: No recent vision problems or eye pain.  ENT: No congestion, ear pain, or sore throat.  Cardiovascular: No chest pain or palpation.  Respiratory: No cough, shortness of breath, congestion, or wheezing.  Gastrointestinal: No abdominal pain, nausea, vomiting, or diarrhea.  Genitourinary: No dysuria.  Musculoskeletal: No joint swelling.  Neurologic: No headache.    PHYSICAL EXAM:  GENERAL: NAD  EYES: EOMI, PERRLA  NECK: Supple, No JVD  CHEST/LUNG: cta chao  HEART:  S1 , S2 +  ABDOMEN: soft ,bs+  EXTREMITIES:  no edema  NEUROLOGY:alert awake oriented     LABS:  09-29    138  |  102  |  12  ----------------------------<  97  3.9   |  27  |  0.71    Ca    9.1      29 Sep 2019 06:59      Creatinine Trend: 0.71 <--, 0.65 <--, 0.70 <--                        12.0   6.57  )-----------( 222      ( 29 Sep 2019 09:26 )             37.7     Urine Studies:                      LIVER FUNCTIONS - ( 27 Sep 2019 09:20 )  Alb: 3.4 g/dL / Pro: 6.9 g/dL / ALK PHOS: 79 U/L / ALT: 19 U/L / AST: 23 U/L / GGT: x               Consultant(s) Notes Reviewed:      Care Discussed with Consultants/Other Providers:
SUBJECTIVE / OVERNIGHT EVENTS:pt says her back pain is much better able to move around without difficulty     MEDICATIONS  (STANDING):  apixaban 5 milliGRAM(s) Oral two times a day  folic acid 1 milliGRAM(s) Oral daily  gabapentin 300 milliGRAM(s) Oral at bedtime  metoprolol succinate ER 25 milliGRAM(s) Oral daily  multivitamin 1 Tablet(s) Oral daily  ondansetron Injectable 4 milliGRAM(s) IV Push once  vancomycin  IVPB 1000 milliGRAM(s) IV Intermittent every 12 hours    MEDICATIONS  (PRN):  acetaminophen   Tablet .. 650 milliGRAM(s) Oral every 6 hours PRN Temp greater or equal to 38C (100.4F), Mild Pain (1 - 3)  ALPRAZolam 0.25 milliGRAM(s) Oral three times a day PRN anxiety  oxyCODONE    5 mG/acetaminophen 325 mG 1 Tablet(s) Oral every 6 hours PRN Moderate Pain (4 - 6)    Vital Signs Last 24 Hrs  T(C): 36.6 (28 Sep 2019 21:06), Max: 36.6 (28 Sep 2019 21:06)  T(F): 97.9 (28 Sep 2019 21:06), Max: 97.9 (28 Sep 2019 21:06)  HR: 69 (28 Sep 2019 21:06) (60 - 69)  BP: 105/51 (28 Sep 2019 21:06) (97/58 - 105/51)  BP(mean): --  RR: 17 (28 Sep 2019 21:06) (17 - 17)  SpO2: 95% (28 Sep 2019 21:06) (93% - 95%)      Constitutional: No fever, fatigue  Skin: No rash.  Eyes: No recent vision problems or eye pain.  ENT: No congestion, ear pain, or sore throat.  Cardiovascular: No chest pain or palpation.  Respiratory: No cough, shortness of breath, congestion, or wheezing.  Gastrointestinal: No abdominal pain, nausea, vomiting, or diarrhea.  Genitourinary: No dysuria.  Musculoskeletal: No joint swelling.  Neurologic: No headache.    PHYSICAL EXAM:  GENERAL: NAD  EYES: EOMI, PERRLA  NECK: Supple, No JVD  CHEST/LUNG: cta chao  HEART:  S1 , S2 +  ABDOMEN: soft ,bs+  EXTREMITIES:  no edema  NEUROLOGY:alert awake oriented       LABS:  09-27    138  |  101  |  10  ----------------------------<  89  3.7   |  26  |  0.65    Ca    9.0      27 Sep 2019 09:20    TPro  6.9  /  Alb  3.4  /  TBili  0.4  /  DBili      /  AST  23  /  ALT  19  /  AlkPhos  79  09-27    Creatinine Trend: 0.65 <--, 0.70 <--                        12.2   8.14  )-----------( 238      ( 27 Sep 2019 12:50 )             38.6     Urine Studies:            LIVER FUNCTIONS - ( 27 Sep 2019 09:20 )  Alb: 3.4 g/dL / Pro: 6.9 g/dL / ALK PHOS: 79 U/L / ALT: 19 U/L / AST: 23 U/L / GGT: x               Consultant(s) Notes Reviewed:      Care Discussed with Consultants/Other Providers:
SUBJECTIVE / OVERNIGHT EVENTS:pt says her back pain is much better able to move around without difficulty     MEDICATIONS  (STANDING):  chlorhexidine 4% Liquid 1 Application(s) Topical <User Schedule>  docusate sodium 100 milliGRAM(s) Oral three times a day  folic acid 1 milliGRAM(s) Oral daily  gabapentin 300 milliGRAM(s) Oral at bedtime  metoprolol succinate ER 25 milliGRAM(s) Oral daily  multivitamin 1 Tablet(s) Oral daily  ondansetron Injectable 4 milliGRAM(s) IV Push once  polyethylene glycol 3350 17 Gram(s) Oral daily  senna 2 Tablet(s) Oral at bedtime  vancomycin  IVPB 1000 milliGRAM(s) IV Intermittent every 12 hours    MEDICATIONS  (PRN):  acetaminophen   Tablet .. 650 milliGRAM(s) Oral every 6 hours PRN Temp greater or equal to 38C (100.4F), Mild Pain (1 - 3)  ALPRAZolam 0.25 milliGRAM(s) Oral three times a day PRN anxiety  oxyCODONE    5 mG/acetaminophen 325 mG 1 Tablet(s) Oral every 6 hours PRN Moderate Pain (4 - 6)  sodium chloride 0.9% lock flush 10 milliLiter(s) IV Push every 1 hour PRN Pre/post blood products, medications, blood draw, and to maintain line patency    Vital Signs Last 24 Hrs  T(C): 36.3 (01 Oct 2019 21:15), Max: 36.6 (01 Oct 2019 12:50)  T(F): 97.3 (01 Oct 2019 21:15), Max: 97.8 (01 Oct 2019 12:50)  HR: 65 (01 Oct 2019 21:45) (49 - 65)  BP: 108/62 (01 Oct 2019 21:45) (89/47 - 122/68)  BP(mean): 71 (01 Oct 2019 20:00) (67 - 71)  RR: 18 (01 Oct 2019 21:15) (16 - 18)  SpO2: 97% (01 Oct 2019 21:15) (95% - 97%)    Constitutional: No fever, fatigue  Skin: No rash.  Eyes: No recent vision problems or eye pain.  ENT: No congestion, ear pain, or sore throat.  Cardiovascular: No chest pain or palpation.  Respiratory: No cough, shortness of breath, congestion, or wheezing.  Gastrointestinal: No abdominal pain, nausea, vomiting, or diarrhea.  Genitourinary: No dysuria.  Musculoskeletal: No joint swelling.  Neurologic: No headache.    PHYSICAL EXAM:  GENERAL: NAD  EYES: EOMI, PERRLA  NECK: Supple, No JVD  CHEST/LUNG: cta chao  HEART:  S1 , S2 +  ABDOMEN: soft ,bs+  EXTREMITIES:  no edema  NEUROLOGY:alert awake oriented     LABS:  10-01    138  |  102  |  10  ----------------------------<  104<H>  4.1   |  27  |  0.69    Ca    9.0      01 Oct 2019 07:01  Mg     1.9     10-01    TPro  6.5  /  Alb  3.3  /  TBili  0.4  /  DBili      /  AST  28  /  ALT  21  /  AlkPhos  86  10-01    Creatinine Trend: 0.69 <--, 0.71 <--, 0.65 <--, 0.70 <--                        12.1   6.74  )-----------( 231      ( 01 Oct 2019 08:04 )             38.3     Urine Studies:            LIVER FUNCTIONS - ( 01 Oct 2019 07:01 )  Alb: 3.3 g/dL / Pro: 6.5 g/dL / ALK PHOS: 86 U/L / ALT: 21 U/L / AST: 28 U/L / GGT: x           PT/INR - ( 30 Sep 2019 07:55 )   PT: 15.0 sec;   INR: 1.31 ratio                       PT/INR - ( 30 Sep 2019 07:55 )   PT: 15.0 sec;   INR: 1.31 ratio                     LIVER FUNCTIONS - ( 27 Sep 2019 09:20 )  Alb: 3.4 g/dL / Pro: 6.9 g/dL / ALK PHOS: 79 U/L / ALT: 19 U/L / AST: 23 U/L / GGT: x               Consultant(s) Notes Reviewed:      Care Discussed with Consultants/Other Providers:
SUBJECTIVE / OVERNIGHT EVENTS:pt says her back pain is much better able to move around without difficulty     MEDICATIONS  (STANDING):  chlorhexidine 4% Liquid 1 Application(s) Topical <User Schedule>  docusate sodium 100 milliGRAM(s) Oral three times a day  folic acid 1 milliGRAM(s) Oral daily  gabapentin 300 milliGRAM(s) Oral at bedtime  metoprolol succinate ER 25 milliGRAM(s) Oral daily  multivitamin 1 Tablet(s) Oral daily  ondansetron Injectable 4 milliGRAM(s) IV Push once  polyethylene glycol 3350 17 Gram(s) Oral daily  senna 2 Tablet(s) Oral at bedtime  vancomycin  IVPB 1000 milliGRAM(s) IV Intermittent every 12 hours    MEDICATIONS  (PRN):  acetaminophen   Tablet .. 650 milliGRAM(s) Oral every 6 hours PRN Temp greater or equal to 38C (100.4F), Mild Pain (1 - 3)  ALPRAZolam 0.25 milliGRAM(s) Oral three times a day PRN anxiety  oxyCODONE    5 mG/acetaminophen 325 mG 1 Tablet(s) Oral every 6 hours PRN Moderate Pain (4 - 6)  sodium chloride 0.9% lock flush 10 milliLiter(s) IV Push every 1 hour PRN Pre/post blood products, medications, blood draw, and to maintain line patency    Vital Signs Last 24 Hrs  T(C): 36.6 (02 Oct 2019 21:22), Max: 37.1 (02 Oct 2019 13:50)  T(F): 97.9 (02 Oct 2019 21:22), Max: 98.8 (02 Oct 2019 13:50)  HR: 75 (02 Oct 2019 21:22) (61 - 81)  BP: 118/66 (02 Oct 2019 21:22) (106/57 - 118/66)  BP(mean): --  RR: 18 (02 Oct 2019 21:22) (18 - 18)  SpO2: 96% (02 Oct 2019 21:22) (96% - 96%)    Constitutional: No fever, fatigue  Skin: No rash.  Eyes: No recent vision problems or eye pain.  ENT: No congestion, ear pain, or sore throat.  Cardiovascular: No chest pain or palpation.  Respiratory: No cough, shortness of breath, congestion, or wheezing.  Gastrointestinal: No abdominal pain, nausea, vomiting, or diarrhea.  Genitourinary: No dysuria.  Musculoskeletal: No joint swelling.  Neurologic: No headache.    PHYSICAL EXAM:  GENERAL: NAD  EYES: EOMI, PERRLA  NECK: Supple, No JVD  CHEST/LUNG: cta chao  HEART:  S1 , S2 +  ABDOMEN: soft ,bs+  EXTREMITIES:  no edema  NEUROLOGY:alert awake oriented     LABS:  10-02    139  |  102  |  12  ----------------------------<  87  3.9   |  27  |  0.62    Ca    8.7      02 Oct 2019 07:34  Mg     1.9     10-01    TPro  6.5  /  Alb  3.3  /  TBili  0.4  /  DBili      /  AST  28  /  ALT  21  /  AlkPhos  86  10-01    Creatinine Trend: 0.62 <--, 0.69 <--, 0.71 <--, 0.65 <--, 0.70 <--                        11.6   7.31  )-----------( 220      ( 02 Oct 2019 09:04 )             36.4     Urine Studies:            LIVER FUNCTIONS - ( 01 Oct 2019 07:01 )  Alb: 3.3 g/dL / Pro: 6.5 g/dL / ALK PHOS: 86 U/L / ALT: 21 U/L / AST: 28 U/L / GGT: x           PT/INR - ( 02 Oct 2019 09:09 )   PT: 12.5 sec;   INR: 1.09 ratio           x           PT/INR - ( 30 Sep 2019 07:55 )   PT: 15.0 sec;   INR: 1.31 ratio                       PT/INR - ( 30 Sep 2019 07:55 )   PT: 15.0 sec;   INR: 1.31 ratio                     LIVER FUNCTIONS - ( 27 Sep 2019 09:20 )  Alb: 3.4 g/dL / Pro: 6.9 g/dL / ALK PHOS: 79 U/L / ALT: 19 U/L / AST: 23 U/L / GGT: x               Consultant(s) Notes Reviewed:      Care Discussed with Consultants/Other Providers:
SUBJECTIVE / OVERNIGHT EVENTS:pt says her back pain is much better able to move around without difficulty     MEDICATIONS  (STANDING):  docusate sodium 100 milliGRAM(s) Oral three times a day  folic acid 1 milliGRAM(s) Oral daily  gabapentin 300 milliGRAM(s) Oral at bedtime  metoprolol succinate ER 25 milliGRAM(s) Oral daily  multivitamin 1 Tablet(s) Oral daily  ondansetron Injectable 4 milliGRAM(s) IV Push once  polyethylene glycol 3350 17 Gram(s) Oral daily  senna 2 Tablet(s) Oral at bedtime  vancomycin  IVPB 1000 milliGRAM(s) IV Intermittent every 12 hours    MEDICATIONS  (PRN):  acetaminophen   Tablet .. 650 milliGRAM(s) Oral every 6 hours PRN Temp greater or equal to 38C (100.4F), Mild Pain (1 - 3)  ALPRAZolam 0.25 milliGRAM(s) Oral three times a day PRN anxiety  oxyCODONE    5 mG/acetaminophen 325 mG 1 Tablet(s) Oral every 6 hours PRN Moderate Pain (4 - 6)    Vital Signs Last 24 Hrs  T(C): 36.7 (30 Sep 2019 21:08), Max: 36.7 (30 Sep 2019 21:08)  T(F): 98.1 (30 Sep 2019 21:08), Max: 98.1 (30 Sep 2019 21:08)  HR: 69 (30 Sep 2019 21:08) (60 - 69)  BP: 106/69 (30 Sep 2019 21:08) (101/50 - 108/55)  BP(mean): --  RR: 17 (30 Sep 2019 21:08) (17 - 17)  SpO2: 96% (30 Sep 2019 21:08) (96% - 97%)    Constitutional: No fever, fatigue  Skin: No rash.  Eyes: No recent vision problems or eye pain.  ENT: No congestion, ear pain, or sore throat.  Cardiovascular: No chest pain or palpation.  Respiratory: No cough, shortness of breath, congestion, or wheezing.  Gastrointestinal: No abdominal pain, nausea, vomiting, or diarrhea.  Genitourinary: No dysuria.  Musculoskeletal: No joint swelling.  Neurologic: No headache.    PHYSICAL EXAM:  GENERAL: NAD  EYES: EOMI, PERRLA  NECK: Supple, No JVD  CHEST/LUNG: cta chao  HEART:  S1 , S2 +  ABDOMEN: soft ,bs+  EXTREMITIES:  no edema  NEUROLOGY:alert awake oriented     LABS:  09-29    138  |  102  |  12  ----------------------------<  97  3.9   |  27  |  0.71    Ca    9.1      29 Sep 2019 06:59      Creatinine Trend: 0.71 <--, 0.65 <--, 0.70 <--                        12.0   6.57  )-----------( 222      ( 29 Sep 2019 09:26 )             37.7     Urine Studies:              PT/INR - ( 30 Sep 2019 07:55 )   PT: 15.0 sec;   INR: 1.31 ratio                     LIVER FUNCTIONS - ( 27 Sep 2019 09:20 )  Alb: 3.4 g/dL / Pro: 6.9 g/dL / ALK PHOS: 79 U/L / ALT: 19 U/L / AST: 23 U/L / GGT: x               Consultant(s) Notes Reviewed:      Care Discussed with Consultants/Other Providers:
[facility]    Vanessa Caballero, Patti, Guru, Irene, Pavel, João      [phone]    CHIEF COMPLAINT: Patient is a 73y old  Female who presents with a chief complaint of back pain (02 Oct 2019 17:23)      Follow Up: ai, bacteremia    Interim history: The patient reports no new symptoms.  Denies chest discomfort and shortness of breath.  No abdominal pain.  No new neurologic symptoms.      MEDICATIONS  (STANDING):  chlorhexidine 4% Liquid 1 Application(s) Topical <User Schedule>  docusate sodium 100 milliGRAM(s) Oral three times a day  folic acid 1 milliGRAM(s) Oral daily  gabapentin 300 milliGRAM(s) Oral at bedtime  metoprolol succinate ER 25 milliGRAM(s) Oral daily  multivitamin 1 Tablet(s) Oral daily  ondansetron Injectable 4 milliGRAM(s) IV Push once  polyethylene glycol 3350 17 Gram(s) Oral daily  senna 2 Tablet(s) Oral at bedtime  vancomycin  IVPB 1000 milliGRAM(s) IV Intermittent every 12 hours    MEDICATIONS  (PRN):  acetaminophen   Tablet .. 650 milliGRAM(s) Oral every 6 hours PRN Temp greater or equal to 38C (100.4F), Mild Pain (1 - 3)  ALPRAZolam 0.25 milliGRAM(s) Oral three times a day PRN anxiety  oxyCODONE    5 mG/acetaminophen 325 mG 1 Tablet(s) Oral every 6 hours PRN Moderate Pain (4 - 6)  sodium chloride 0.9% lock flush 10 milliLiter(s) IV Push every 1 hour PRN Pre/post blood products, medications, blood draw, and to maintain line patency      REVIEW OF SYSTEMS:  eye, ent, GI, , allergic, dermatologic, musculoskeletal and neurologic are negative except as described above    Vital Signs Last 24 Hrs  T(C): 36.6 (03 Oct 2019 04:23), Max: 37.1 (02 Oct 2019 13:50)  T(F): 97.9 (03 Oct 2019 04:23), Max: 98.8 (02 Oct 2019 13:50)  HR: 61 (03 Oct 2019 05:58) (61 - 81)  BP: 115/61 (03 Oct 2019 05:58) (99/56 - 118/66)  BP(mean): --  RR: 18 (03 Oct 2019 04:23) (18 - 18)  SpO2: 95% (03 Oct 2019 04:23) (95% - 96%)    I&O's Summary    02 Oct 2019 07:01  -  03 Oct 2019 07:00  --------------------------------------------------------  IN: 680 mL / OUT: 0 mL / NET: 680 mL        Telemetry past 24h:    PHYSICAL EXAM:    Constitutional: well-nourished, well-developed, NAD   HEENT:  MMM, sclerae anicteric, conjunctivae clear, no oral cyanosis.  Pulmonary: Non-labored, breath sounds are clear bilaterally, No wheezing, rales or rhonchi  Cardiovascular: Regular, S1 and S2.  No murmur.  No rubs, gallops or clicks  Gastrointestinal: Bowel Sounds present, soft, nontender.   Lymph: No peripheral edema.   Neurological: Alert, no focal deficits  Skin: No rashes.  Psych:  Mood & affect appropriate    LABS: All Labs Reviewed:                        11.5   7.18  )-----------( 210      ( 03 Oct 2019 08:53 )             36.4                         11.6   7.31  )-----------( 220      ( 02 Oct 2019 09:04 )             36.4                         12.1   6.74  )-----------( 231      ( 01 Oct 2019 08:04 )             38.3     03 Oct 2019 06:22    141    |  104    |  14     ----------------------------<  91     3.9     |  27     |  0.65   02 Oct 2019 07:34    139    |  102    |  12     ----------------------------<  87     3.9     |  27     |  0.62   01 Oct 2019 07:01    138    |  102    |  10     ----------------------------<  104    4.1     |  27     |  0.69     Ca    9.0        03 Oct 2019 06:22  Ca    8.7        02 Oct 2019 07:34  Ca    9.0        01 Oct 2019 07:01  Mg     1.9       01 Oct 2019 07:01    TPro  6.5    /  Alb  3.3    /  TBili  0.4    /  DBili  x      /  AST  28     /  ALT  21     /  AlkPhos  86     01 Oct 2019 07:01    PT/INR - ( 02 Oct 2019 09:09 )   PT: 12.5 sec;   INR: 1.09 ratio               Blood Culture: Organism --  Gram Stain Blood -- Gram Stain --  Specimen Source .Catheter right side port  Culture-Blood --    Organism --  Gram Stain Blood -- Gram Stain   Growth in anaerobic bottle: Gram Positive Cocci in Clusters  Specimen Source .Blood  Culture-Blood --            RADIOLOGY:    EKG:    Echo:

## 2019-10-04 LAB
CULTURE RESULTS: SIGNIFICANT CHANGE UP
SPECIMEN SOURCE: SIGNIFICANT CHANGE UP

## 2019-10-05 LAB
-  AMPICILLIN/SULBACTAM: SIGNIFICANT CHANGE UP
-  CEFAZOLIN: SIGNIFICANT CHANGE UP
-  CLINDAMYCIN: SIGNIFICANT CHANGE UP
-  ERYTHROMYCIN: SIGNIFICANT CHANGE UP
-  GENTAMICIN: SIGNIFICANT CHANGE UP
-  OXACILLIN: SIGNIFICANT CHANGE UP
-  RIFAMPIN: SIGNIFICANT CHANGE UP
-  TETRACYCLINE: SIGNIFICANT CHANGE UP
-  TRIMETHOPRIM/SULFAMETHOXAZOLE: SIGNIFICANT CHANGE UP
-  VANCOMYCIN: SIGNIFICANT CHANGE UP
CULTURE RESULTS: SIGNIFICANT CHANGE UP
METHOD TYPE: SIGNIFICANT CHANGE UP
ORGANISM # SPEC MICROSCOPIC CNT: SIGNIFICANT CHANGE UP
ORGANISM # SPEC MICROSCOPIC CNT: SIGNIFICANT CHANGE UP
SPECIMEN SOURCE: SIGNIFICANT CHANGE UP

## 2019-10-06 LAB
CULTURE RESULTS: SIGNIFICANT CHANGE UP
SPECIMEN SOURCE: SIGNIFICANT CHANGE UP

## 2019-10-07 LAB
CULTURE RESULTS: SIGNIFICANT CHANGE UP
CULTURE RESULTS: SIGNIFICANT CHANGE UP
SPECIMEN SOURCE: SIGNIFICANT CHANGE UP
SPECIMEN SOURCE: SIGNIFICANT CHANGE UP

## 2019-10-08 PROBLEM — G61.0 GUILLAIN-BARRE SYNDROME: Chronic | Status: ACTIVE | Noted: 2019-09-26

## 2019-10-08 PROBLEM — I48.91 UNSPECIFIED ATRIAL FIBRILLATION: Chronic | Status: ACTIVE | Noted: 2019-09-26

## 2019-10-08 PROBLEM — M54.9 DORSALGIA, UNSPECIFIED: Chronic | Status: ACTIVE | Noted: 2019-09-26

## 2019-10-08 PROBLEM — I10 ESSENTIAL (PRIMARY) HYPERTENSION: Chronic | Status: ACTIVE | Noted: 2019-09-26

## 2019-10-08 PROBLEM — C56.9 MALIGNANT NEOPLASM OF UNSPECIFIED OVARY: Chronic | Status: ACTIVE | Noted: 2019-09-26

## 2019-10-15 ENCOUNTER — APPOINTMENT (OUTPATIENT)
Dept: ORTHOPEDIC SURGERY | Facility: CLINIC | Age: 74
End: 2019-10-15
Payer: MEDICARE

## 2019-10-15 DIAGNOSIS — M47.816 SPONDYLOSIS W/OUT MYELOPATHY OR RADICULOPATHY, LUMBAR REGION: ICD-10-CM

## 2019-10-15 PROCEDURE — 99214 OFFICE O/P EST MOD 30 MIN: CPT

## 2019-10-15 NOTE — HISTORY OF PRESENT ILLNESS
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  She had her Mediport from her ovarian cancer removed on October 1 prior to discharge from the hospital.  The cultures of the Mediport were negative.  She was already on intravenous antibiotics at that point.  She is wearing her brace full-time.  She finds the brace uncomfortable mostly from the sternal pad.

## 2019-10-15 NOTE — DISCUSSION/SUMMARY
[de-identified] : He will continue with full-time brace immobilization.  Hopefully she will go on to spontaneous arthrodesis at the side of her osteomyelitis.  I will see her for follow-up in 6 weeks.  She will call if there are change in her symptoms.

## 2019-10-15 NOTE — PHYSICAL EXAM
[de-identified] : Lower extremity neurological exam continues to show symmetrical reflexes with normal motor power.  Straight leg raising is negative to 90 degrees. [de-identified] : AP and lateral x-rays continue to show some mild kyphosis at the site of the discitis.  Her CAT scan in September and her MRI in the hospital show some serpiginous irregularities at the T11-12 level with interdigitation that would likely prevent instability.

## 2019-10-24 ENCOUNTER — CHART COPY (OUTPATIENT)
Age: 74
End: 2019-10-24

## 2019-11-26 ENCOUNTER — APPOINTMENT (OUTPATIENT)
Dept: ORTHOPEDIC SURGERY | Facility: CLINIC | Age: 74
End: 2019-11-26
Payer: MEDICARE

## 2019-11-26 VITALS — DIASTOLIC BLOOD PRESSURE: 68 MMHG | HEART RATE: 70 BPM | SYSTOLIC BLOOD PRESSURE: 134 MMHG

## 2019-11-26 DIAGNOSIS — M54.5 LOW BACK PAIN: ICD-10-CM

## 2019-11-26 DIAGNOSIS — M43.16 SPONDYLOLISTHESIS, LUMBAR REGION: ICD-10-CM

## 2019-11-26 PROCEDURE — 99214 OFFICE O/P EST MOD 30 MIN: CPT

## 2019-11-26 PROCEDURE — 72100 X-RAY EXAM L-S SPINE 2/3 VWS: CPT

## 2019-11-26 NOTE — HISTORY OF PRESENT ILLNESS
[de-identified] : He has finished antibiotic treatment for her staph epi discitis/osteomyelitis.  She is having less discomfort from the brace and very little discomfort from her back.  There is some tiredness in the back with prolonged standing.  She comes in today wearing the brace.

## 2019-11-26 NOTE — PHYSICAL EXAM
[de-identified] : Out of the brace shows no evidence of any significant kyphosis.  AP and lateral x-rays of the spine were obtained to ascertain after she has gone on to spontaneous fusion at the T12-L1 level.  Overlying gas makes it difficult to prove but at this point she is comfortable and I will allow her to taper out of the brace as tolerated.  I will see her for follow-up in 5 or 6 weeks on a as needed basis.

## 2020-01-06 ENCOUNTER — INBOUND DOCUMENT (OUTPATIENT)
Age: 75
End: 2020-01-06

## 2020-08-14 ENCOUNTER — RESULT REVIEW (OUTPATIENT)
Age: 75
End: 2020-08-14

## 2020-08-28 ENCOUNTER — APPOINTMENT (OUTPATIENT)
Dept: ORTHOPEDIC SURGERY | Facility: CLINIC | Age: 75
End: 2020-08-28
Payer: MEDICARE

## 2020-08-28 VITALS
SYSTOLIC BLOOD PRESSURE: 134 MMHG | HEART RATE: 80 BPM | HEIGHT: 64 IN | BODY MASS INDEX: 21.34 KG/M2 | WEIGHT: 125 LBS | DIASTOLIC BLOOD PRESSURE: 55 MMHG | TEMPERATURE: 94 F

## 2020-08-28 DIAGNOSIS — M46.46 DISCITIS, UNSPECIFIED, LUMBAR REGION: ICD-10-CM

## 2020-08-28 PROCEDURE — 99214 OFFICE O/P EST MOD 30 MIN: CPT

## 2020-08-28 PROCEDURE — 72100 X-RAY EXAM L-S SPINE 2/3 VWS: CPT

## 2020-08-28 NOTE — DISCUSSION/SUMMARY
[Medication Risks Reviewed] : Medication risks reviewed [de-identified] : Will use moist heat.  She will take 2 extra strength Tylenol 1/2-hour before she goes out for her 1 mile walk.  She can ride an exercise bike without any pain.  She can use a back support.  If this fails to give her relief we discussed the possibility of putting her on a short course of meloxicam.

## 2020-08-28 NOTE — PHYSICAL EXAM
[de-identified] : On examination she has a mildly increased kyphosis.  There is no palpable step-off or she has a known spondylolisthesis at L4-5.  There is no tenderness of the spine. [de-identified] : AP and lateral x-rays reveal some local kyphosis at the site of the prior discitis osteomyelitis.  The T12-L1 level may have gone on to spontaneous arthrodesis.  She has a known grade 1/2 degenerative spondylolisthesis at L4-5.

## 2020-08-28 NOTE — HISTORY OF PRESENT ILLNESS
[de-identified] : She had a staph epi discitis osteomyelitis at T12-L1 last fall.  Blood cultures were all positive but sed rate and C-reactive protein were minimally elevated.  She was immobilized in a brace.  She was last seen last November.  She continues on Eliquis.  She needs a pillow to support her lower back when she is sitting.  Her back gets tired standing for a period of time and it is bothersome in the lower lumbar spine after she walks more than a mile.

## 2020-08-28 NOTE — REASON FOR VISIT
[Follow-Up Visit] : a follow-up visit for [Degenerative Joint Disease] : degenerative joint disease [Back Pain] : back pain [Spondylolistheses] : spondylolistheses

## 2020-09-11 ENCOUNTER — OUTPATIENT (OUTPATIENT)
Dept: OUTPATIENT SERVICES | Facility: HOSPITAL | Age: 75
LOS: 1 days | End: 2020-09-11
Payer: MEDICARE

## 2020-09-11 ENCOUNTER — APPOINTMENT (OUTPATIENT)
Dept: CT IMAGING | Facility: IMAGING CENTER | Age: 75
End: 2020-09-11
Payer: MEDICARE

## 2020-09-11 DIAGNOSIS — R31.29 OTHER MICROSCOPIC HEMATURIA: ICD-10-CM

## 2020-09-11 PROCEDURE — 74178 CT ABD&PLV WO CNTR FLWD CNTR: CPT | Mod: 26

## 2020-09-11 PROCEDURE — 74178 CT ABD&PLV WO CNTR FLWD CNTR: CPT

## 2020-09-11 PROCEDURE — 82565 ASSAY OF CREATININE: CPT

## 2020-09-17 ENCOUNTER — TRANSCRIPTION ENCOUNTER (OUTPATIENT)
Age: 75
End: 2020-09-17

## 2020-11-04 ENCOUNTER — OUTPATIENT (OUTPATIENT)
Dept: OUTPATIENT SERVICES | Facility: HOSPITAL | Age: 75
LOS: 1 days | End: 2020-11-04
Payer: MEDICARE

## 2020-11-04 ENCOUNTER — APPOINTMENT (OUTPATIENT)
Dept: CT IMAGING | Facility: CLINIC | Age: 75
End: 2020-11-04
Payer: MEDICARE

## 2020-11-04 DIAGNOSIS — K63.5 POLYP OF COLON: ICD-10-CM

## 2020-11-04 PROCEDURE — 74261 CT COLONOGRAPHY DX: CPT | Mod: 26

## 2020-11-04 PROCEDURE — 74261 CT COLONOGRAPHY DX: CPT

## 2021-08-19 ENCOUNTER — RESULT REVIEW (OUTPATIENT)
Age: 76
End: 2021-08-19

## 2022-09-05 NOTE — ED ADULT NURSE NOTE - [facility] ED NUR SEVERITY2
Mild dysuria, no other symptoms.   In ED UA concerning for UTI    - Rocephin course started   - Ucx pending   - low threshold to broaden abx if develops symptoms or concerns for complicated cystis  ( low suspicion at this time)   -  CT abd pelvis in ED with New non enhancement in the medial cortex of the mid RIGHT kidney suggesting mass versus nephritis.       PAIN SCALE 4 OF 10.

## 2023-01-01 NOTE — DISCHARGE NOTE PROVIDER - NSDCQMCOGNITION_NEU_ALL_CORE
For information on Fall & Injury Prevention, visit: [email or web address] OR  [email or web address] OR  https://www.cdc.gov/steadi/patient.html
No difficulties

## 2023-12-18 NOTE — HISTORY OF PRESENT ILLNESS
Pressure wire removed. [Pain Location] : pain [de-identified] : This 73-year-old female awoke with back pain on June 23.  She has had prior extensive surgery for diffuse ovarian cancer in 2012.  Currently there is no evidence of any active disease.  She has chronic atrial fibrillation on anticoagulation therapy.  She was initially treated with muscle relaxants.  She then saw her rheumatologist and had a steroid injection and then a double Medrol Dosepak.  She has been evaluated at [facility].  She has had 2 MRIs of the spine and a CT scan none of which are available for review nor are the reports available.  Currently she has left-sided lower back pain without associated buttock or leg pain. [8] : a maximum pain level of 8/10

## 2024-09-05 NOTE — H&P ADULT - SKIN
Completed 3 month smart form per CTTS visit and notes from today 9/5/24.    detailed exam warm and dry

## 2024-11-25 NOTE — PROVIDER CONTACT NOTE (CRITICAL VALUE NOTIFICATION) - ASSESSMENT
MYCH[employer]  
vss, no acute distress noted
A&Ox4. No acute distress noted, afebrile. VSS.
